# Patient Record
Sex: FEMALE | Race: OTHER | Employment: FULL TIME | URBAN - METROPOLITAN AREA
[De-identification: names, ages, dates, MRNs, and addresses within clinical notes are randomized per-mention and may not be internally consistent; named-entity substitution may affect disease eponyms.]

---

## 2017-10-22 ENCOUNTER — HOSPITAL ENCOUNTER (EMERGENCY)
Facility: HOSPITAL | Age: 47
Discharge: HOME | End: 2017-10-22
Attending: EMERGENCY MEDICINE
Payer: COMMERCIAL

## 2017-10-22 ENCOUNTER — APPOINTMENT (OUTPATIENT)
Dept: CT IMAGING | Facility: HOSPITAL | Age: 47
End: 2017-10-22
Attending: EMERGENCY MEDICINE
Payer: COMMERCIAL

## 2017-10-22 VITALS
WEIGHT: 155 LBS | HEIGHT: 62 IN | SYSTOLIC BLOOD PRESSURE: 116 MMHG | TEMPERATURE: 98 F | BODY MASS INDEX: 28.52 KG/M2 | RESPIRATION RATE: 16 BRPM | HEART RATE: 60 BPM | OXYGEN SATURATION: 98 % | DIASTOLIC BLOOD PRESSURE: 71 MMHG

## 2017-10-22 DIAGNOSIS — R11.2 NAUSEA AND VOMITING IN ADULT: Primary | ICD-10-CM

## 2017-10-22 DIAGNOSIS — G44.209 TENSION HEADACHE: ICD-10-CM

## 2017-10-22 PROCEDURE — 80048 BASIC METABOLIC PNL TOTAL CA: CPT

## 2017-10-22 PROCEDURE — 36415 COLL VENOUS BLD VENIPUNCTURE: CPT

## 2017-10-22 PROCEDURE — 99284 EMERGENCY DEPT VISIT MOD MDM: CPT

## 2017-10-22 PROCEDURE — 85025 COMPLETE CBC W/AUTO DIFF WBC: CPT

## 2017-10-22 PROCEDURE — 80048 BASIC METABOLIC PNL TOTAL CA: CPT | Performed by: EMERGENCY MEDICINE

## 2017-10-22 PROCEDURE — 70450 CT HEAD/BRAIN W/O DYE: CPT | Performed by: EMERGENCY MEDICINE

## 2017-10-22 PROCEDURE — 85025 COMPLETE CBC W/AUTO DIFF WBC: CPT | Performed by: EMERGENCY MEDICINE

## 2017-10-22 NOTE — ED PROVIDER NOTES
Patient Seen in: [facility] AND [facility] Emergency Department    History   Patient presents with:  Headache (neurologic)    Stated Complaint:     HPI    Patient is a 48-year-old female who presents with headache ×4 days.   Patient states started at work 4 days for the following:        Result Value    Glucose 107 (*)     All other components within normal limits   CBC W/ DIFFERENTIAL - Normal   CBC WITH DIFFERENTIAL WITH PLATELET    Narrative:      The following orders were created for panel order CBC WITH DIFFER

## 2018-08-18 ENCOUNTER — LAB ENCOUNTER (OUTPATIENT)
Dept: LAB | Age: 48
End: 2018-08-18
Attending: FAMILY MEDICINE
Payer: COMMERCIAL

## 2018-08-18 DIAGNOSIS — Z00.00 WELLNESS EXAMINATION: Primary | ICD-10-CM

## 2018-08-18 LAB
ALBUMIN SERPL BCP-MCNC: 3.8 G/DL (ref 3.5–4.8)
ALBUMIN/GLOB SERPL: 1.1 {RATIO} (ref 1–2)
ALP SERPL-CCNC: 70 U/L (ref 32–100)
ALT SERPL-CCNC: 108 U/L (ref 14–54)
ANION GAP SERPL CALC-SCNC: 7 MMOL/L (ref 0–18)
AST SERPL-CCNC: 91 U/L (ref 15–41)
BASOPHILS # BLD: 0.1 K/UL (ref 0–0.2)
BASOPHILS NFR BLD: 1 %
BILIRUB SERPL-MCNC: 0.6 MG/DL (ref 0.3–1.2)
BUN SERPL-MCNC: 12 MG/DL (ref 8–20)
BUN/CREAT SERPL: 20.3 (ref 10–20)
CALCIUM SERPL-MCNC: 9.2 MG/DL (ref 8.5–10.5)
CHLORIDE SERPL-SCNC: 106 MMOL/L (ref 95–110)
CHOLEST SERPL-MCNC: 198 MG/DL (ref 110–200)
CO2 SERPL-SCNC: 27 MMOL/L (ref 22–32)
CREAT SERPL-MCNC: 0.59 MG/DL (ref 0.5–1.5)
EOSINOPHIL # BLD: 0.1 K/UL (ref 0–0.7)
EOSINOPHIL NFR BLD: 2 %
ERYTHROCYTE [DISTWIDTH] IN BLOOD BY AUTOMATED COUNT: 13.4 % (ref 11–15)
GLOBULIN PLAS-MCNC: 3.6 G/DL (ref 2.5–3.7)
GLUCOSE SERPL-MCNC: 117 MG/DL (ref 70–99)
HCT VFR BLD AUTO: 39.3 % (ref 35–48)
HDLC SERPL-MCNC: 31 MG/DL
HGB BLD-MCNC: 13.4 G/DL (ref 12–16)
LDLC SERPL CALC-MCNC: 142 MG/DL (ref 0–99)
LYMPHOCYTES # BLD: 1.8 K/UL (ref 1–4)
LYMPHOCYTES NFR BLD: 41 %
MCH RBC QN AUTO: 32.1 PG (ref 27–32)
MCHC RBC AUTO-ENTMCNC: 34.1 G/DL (ref 32–37)
MCV RBC AUTO: 94.1 FL (ref 80–100)
MONOCYTES # BLD: 0.4 K/UL (ref 0–1)
MONOCYTES NFR BLD: 8 %
NEUTROPHILS # BLD AUTO: 2.1 K/UL (ref 1.8–7.7)
NEUTROPHILS NFR BLD: 48 %
NONHDLC SERPL-MCNC: 167 MG/DL
OSMOLALITY UR CALC.SUM OF ELEC: 291 MOSM/KG (ref 275–295)
PATIENT FASTING: YES
PLATELET # BLD AUTO: 199 K/UL (ref 140–400)
PMV BLD AUTO: 10.5 FL (ref 7.4–10.3)
POTASSIUM SERPL-SCNC: 4.5 MMOL/L (ref 3.3–5.1)
PROT SERPL-MCNC: 7.4 G/DL (ref 5.9–8.4)
RBC # BLD AUTO: 4.17 M/UL (ref 3.7–5.4)
SODIUM SERPL-SCNC: 140 MMOL/L (ref 136–144)
TRIGL SERPL-MCNC: 123 MG/DL (ref 1–149)
TSH SERPL-ACNC: 2.51 UIU/ML (ref 0.45–5.33)
WBC # BLD AUTO: 4.4 K/UL (ref 4–11)

## 2018-08-18 PROCEDURE — 85025 COMPLETE CBC W/AUTO DIFF WBC: CPT

## 2018-08-18 PROCEDURE — 80061 LIPID PANEL: CPT

## 2018-08-18 PROCEDURE — 36415 COLL VENOUS BLD VENIPUNCTURE: CPT

## 2018-08-18 PROCEDURE — 83036 HEMOGLOBIN GLYCOSYLATED A1C: CPT

## 2018-08-18 PROCEDURE — 84443 ASSAY THYROID STIM HORMONE: CPT

## 2018-08-18 PROCEDURE — 80053 COMPREHEN METABOLIC PANEL: CPT

## 2018-08-19 LAB — HBA1C MFR BLD: 6.2 % (ref 4–6)

## 2018-08-22 ENCOUNTER — HOSPITAL ENCOUNTER (OUTPATIENT)
Dept: MAMMOGRAPHY | Age: 48
Discharge: HOME | End: 2018-08-22
Attending: FAMILY MEDICINE
Payer: COMMERCIAL

## 2018-08-22 DIAGNOSIS — Z12.31 ENCOUNTER FOR SCREENING MAMMOGRAM FOR MALIGNANT NEOPLASM OF BREAST: ICD-10-CM

## 2018-08-22 PROCEDURE — 77067 SCR MAMMO BI INCL CAD: CPT | Performed by: FAMILY MEDICINE

## 2018-09-06 ENCOUNTER — LAB ENCOUNTER (OUTPATIENT)
Dept: LAB | Age: 48
End: 2018-09-06
Attending: FAMILY MEDICINE
Payer: COMMERCIAL

## 2018-09-06 DIAGNOSIS — R74.8 ELEVATED LIVER ENZYMES: Primary | ICD-10-CM

## 2018-09-06 LAB
IGA SERPL-MCNC: 330 MG/DL (ref 68–378)
IGM SERPL-MCNC: 154 MG/DL (ref 60–263)
IMMUNOGLOBULIN PNL SER-MCNC: 1115 MG/DL (ref 694–1618)
TRANSFERRIN SERPL-MCNC: 231 MG/DL (ref 192–382)

## 2018-09-06 PROCEDURE — 82784 ASSAY IGA/IGD/IGG/IGM EACH: CPT

## 2018-09-06 PROCEDURE — 36415 COLL VENOUS BLD VENIPUNCTURE: CPT

## 2018-09-06 PROCEDURE — 86256 FLUORESCENT ANTIBODY TITER: CPT

## 2018-09-06 PROCEDURE — 82390 ASSAY OF CERULOPLASMIN: CPT

## 2018-09-06 PROCEDURE — 84466 ASSAY OF TRANSFERRIN: CPT

## 2018-09-06 PROCEDURE — 86038 ANTINUCLEAR ANTIBODIES: CPT

## 2018-09-08 LAB — CERULOPLASMIN: 27 MG/DL

## 2018-09-10 LAB — NUCLEAR IGG TITR SER IF: NEGATIVE {TITER}

## 2018-09-11 LAB — SMOOTH MUSCLE AB TITR SER: <20 {TITER}

## 2018-12-13 PROBLEM — M54.2 NECK PAIN: Status: ACTIVE | Noted: 2018-12-13

## 2018-12-13 PROBLEM — F43.10 POST-TRAUMATIC STRESS: Status: ACTIVE | Noted: 2018-12-13

## 2018-12-13 PROBLEM — M25.512 ACUTE PAIN OF LEFT SHOULDER: Status: ACTIVE | Noted: 2018-12-13

## 2018-12-13 PROBLEM — M54.6 ACUTE BILATERAL THORACIC BACK PAIN: Status: ACTIVE | Noted: 2018-12-13

## 2018-12-13 PROBLEM — M54.50 ACUTE BILATERAL LOW BACK PAIN WITHOUT SCIATICA: Status: ACTIVE | Noted: 2018-12-13

## 2018-12-13 PROBLEM — R10.2 PELVIC PAIN: Status: ACTIVE | Noted: 2018-12-13
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
## 2018-12-13 NOTE — PATIENT INSTRUCTIONS
STRAINS, SPRAINS, MUSCLE PULLS    *A strain is a stretching or tearing of muscle or tendon. A tendon is a fibrous cord of tissue that connects muscles to bones.      *A sprain is a stretching or tearing of ligaments — the tough bands of fibrous tissue that helpful. You have prescribed pain medication. After the first two days, gently begin to use the injured area. You should see a gradual, progressive improvement in the joint's ability to support your weight or your ability to move without pain.  Mild and

## 2018-12-13 NOTE — ASSESSMENT & PLAN NOTE
Naproxen 500 mg I po bid with food  Flexeril 5 mg I po tid prn-may cause drowsiness  Ice 20 min 3-4 times per day  Refer PT  Please call if symptoms worsen or are not resolving.

## 2018-12-19 ENCOUNTER — OFFICE VISIT (OUTPATIENT)
Dept: PHYSICAL THERAPY | Age: 48
End: 2018-12-19
Attending: NURSE PRACTITIONER
Payer: COMMERCIAL

## 2018-12-19 DIAGNOSIS — R10.2 PELVIC PAIN: ICD-10-CM

## 2018-12-19 DIAGNOSIS — M54.6 ACUTE BILATERAL THORACIC BACK PAIN: ICD-10-CM

## 2018-12-19 DIAGNOSIS — M25.512 ACUTE PAIN OF LEFT SHOULDER: ICD-10-CM

## 2018-12-19 DIAGNOSIS — M54.50 ACUTE BILATERAL LOW BACK PAIN WITHOUT SCIATICA: ICD-10-CM

## 2018-12-19 DIAGNOSIS — M54.2 NECK PAIN: ICD-10-CM

## 2018-12-19 PROCEDURE — 97162 PT EVAL MOD COMPLEX 30 MIN: CPT

## 2018-12-19 PROCEDURE — 97110 THERAPEUTIC EXERCISES: CPT

## 2018-12-19 NOTE — PROGRESS NOTES
P.T. EVALUATION:   Referring Physician: Dr. Radu Hernandez  Diagnosis: Acute pain of left shoulder (M25.512)  Acute bilateral low back pain without sciatica (M54.5)  Acute bilateral thoracic back pain (M54.6)  Pelvic pain (R10.2)  Neck pain (M54.2)  Date of On Cervical ROM:  Ext: Min loss (pain)  Flx: Min/Mod loss (pain)  Rot: R min loss (left pain), L min loss (NE)   Lat flx: R min loss (NE), L WNL (pain R side neck)    Shoulder ROM:   Flx: R WNL, L 75% range (pain)  Abd: R WNL, L min loss (pain)  ER/HBH: B W possible to [phone].  If you have any questions, please contact me at Dept: [phone]    Sincerely,  Electronically signed by therapist: Nimo Szymanski, PT    [de-identified] certification required: Yes  I certify the need for these services furnished un

## 2018-12-21 ENCOUNTER — OFFICE VISIT (OUTPATIENT)
Dept: PHYSICAL THERAPY | Age: 48
End: 2018-12-21
Attending: NURSE PRACTITIONER
Payer: COMMERCIAL

## 2018-12-21 DIAGNOSIS — M54.50 ACUTE BILATERAL LOW BACK PAIN WITHOUT SCIATICA: ICD-10-CM

## 2018-12-21 DIAGNOSIS — M54.2 NECK PAIN: ICD-10-CM

## 2018-12-21 DIAGNOSIS — R10.2 PELVIC PAIN: ICD-10-CM

## 2018-12-21 DIAGNOSIS — M25.512 ACUTE PAIN OF LEFT SHOULDER: ICD-10-CM

## 2018-12-21 DIAGNOSIS — M54.6 ACUTE BILATERAL THORACIC BACK PAIN: ICD-10-CM

## 2018-12-21 PROCEDURE — 97110 THERAPEUTIC EXERCISES: CPT

## 2018-12-21 NOTE — PROGRESS NOTES
Diagnosis: Acute pain of left shoulder (M25.512)  Acute bilateral low back pain without sciatica (M54.5)  Acute bilateral thoracic back pain (M54.6)  Pelvic pain (R10.2)  Neck pain (M54.2)       Next MD visit: none scheduled  Fall Risk: standard         Pr

## 2018-12-26 ENCOUNTER — OFFICE VISIT (OUTPATIENT)
Dept: PHYSICAL THERAPY | Age: 48
End: 2018-12-26
Attending: NURSE PRACTITIONER
Payer: COMMERCIAL

## 2018-12-26 DIAGNOSIS — R10.2 PELVIC PAIN: ICD-10-CM

## 2018-12-26 DIAGNOSIS — M54.50 ACUTE BILATERAL LOW BACK PAIN WITHOUT SCIATICA: ICD-10-CM

## 2018-12-26 DIAGNOSIS — M25.512 ACUTE PAIN OF LEFT SHOULDER: ICD-10-CM

## 2018-12-26 DIAGNOSIS — M54.6 ACUTE BILATERAL THORACIC BACK PAIN: ICD-10-CM

## 2018-12-26 DIAGNOSIS — M54.2 NECK PAIN: ICD-10-CM

## 2018-12-26 PROCEDURE — 97110 THERAPEUTIC EXERCISES: CPT

## 2018-12-26 PROCEDURE — 97140 MANUAL THERAPY 1/> REGIONS: CPT

## 2018-12-26 NOTE — PROGRESS NOTES
Diagnosis: Acute pain of left shoulder (M25.512)  Acute bilateral low back pain without sciatica (M54.5)  Acute bilateral thoracic back pain (M54.6)  Pelvic pain (R10.2)  Neck pain (M54.2)       Next MD visit: none scheduled  Fall Risk: standard         Pr scap high rows with Vabaduse 41 1x10  - standing B shoulder ext with Vabaduse 41 1x10  - standing B shoulder ER with Vabaduse 41 1x10     Manual Therapy   - trigger point suboccipitals/cervical musculature x 10 minutes -    Therapeutic Activity    -    Modalities    -        Asse

## 2019-01-03 ENCOUNTER — OFFICE VISIT (OUTPATIENT)
Dept: PHYSICAL THERAPY | Age: 49
End: 2019-01-03
Attending: NURSE PRACTITIONER
Payer: COMMERCIAL

## 2019-01-03 DIAGNOSIS — M54.6 ACUTE BILATERAL THORACIC BACK PAIN: ICD-10-CM

## 2019-01-03 DIAGNOSIS — M54.2 NECK PAIN: ICD-10-CM

## 2019-01-03 DIAGNOSIS — M25.512 ACUTE PAIN OF LEFT SHOULDER: ICD-10-CM

## 2019-01-03 DIAGNOSIS — M54.50 ACUTE BILATERAL LOW BACK PAIN WITHOUT SCIATICA: ICD-10-CM

## 2019-01-03 DIAGNOSIS — R10.2 PELVIC PAIN: ICD-10-CM

## 2019-01-03 PROCEDURE — 97110 THERAPEUTIC EXERCISES: CPT

## 2019-01-03 NOTE — PROGRESS NOTES
Diagnosis: Acute pain of left shoulder (M25.512)  Acute bilateral low back pain without sciatica (M54.5)  Acute bilateral thoracic back pain (M54.6)  Pelvic pain (R10.2)  Neck pain (M54.2)       Next MD visit: none scheduled  Fall Risk: standard         Pr hooklying R/L LTR 1 x 10 each (R sided discomfort with left rotation)  - supine R/L SKTC 1 x 10 each   - supine R/L SLR 1 x 10 each (L SLR L sided LBP)  - CKC towel flex stretch R/L sidebend 1 x 10 each  - reassess cervical ROM x 4 minutes  - seated C-retr

## 2019-01-08 ENCOUNTER — OFFICE VISIT (OUTPATIENT)
Dept: PHYSICAL THERAPY | Age: 49
End: 2019-01-08
Attending: NURSE PRACTITIONER
Payer: COMMERCIAL

## 2019-01-08 DIAGNOSIS — M25.512 ACUTE PAIN OF LEFT SHOULDER: ICD-10-CM

## 2019-01-08 DIAGNOSIS — M54.2 NECK PAIN: ICD-10-CM

## 2019-01-08 DIAGNOSIS — M54.50 ACUTE BILATERAL LOW BACK PAIN WITHOUT SCIATICA: ICD-10-CM

## 2019-01-08 DIAGNOSIS — M54.6 ACUTE BILATERAL THORACIC BACK PAIN: ICD-10-CM

## 2019-01-08 DIAGNOSIS — R10.2 PELVIC PAIN: ICD-10-CM

## 2019-01-08 PROCEDURE — 97110 THERAPEUTIC EXERCISES: CPT

## 2019-01-08 NOTE — PROGRESS NOTES
Diagnosis: Acute pain of left shoulder (M25.512)  Acute bilateral low back pain without sciatica (M54.5)  Acute bilateral thoracic back pain (M54.6)  Pelvic pain (R10.2)  Neck pain (M54.2)       Next MD visit: none scheduled  Fall Risk: standard         Pr holds 1 x 10   - seated C-extension upper/lower with towel OP 1 x 10 each  - seated R cervical rotation 1 x 10 AROM with towel OP   - seated thoracic extension ball OP 1 x 10  - seated scap squeeze with ball 10\" holds x 10 reps   - standing B narrow high ball 10x  - seated thoracic ext over ball 2x10  - supine c-retraction 10x  - supine repeat c-retraction 10x  - supine R/L c-rotation 10x ea  - supine shoulder flexion with wand 2x10  - supine B horizontal abd/add with 0# 1x10  - standing B shoulder extensi

## 2019-01-09 DIAGNOSIS — M54.2 NECK PAIN: ICD-10-CM

## 2019-01-09 DIAGNOSIS — R10.2 PELVIC PAIN: ICD-10-CM

## 2019-01-09 DIAGNOSIS — M54.50 ACUTE BILATERAL LOW BACK PAIN WITHOUT SCIATICA: ICD-10-CM

## 2019-01-09 DIAGNOSIS — M25.512 ACUTE PAIN OF LEFT SHOULDER: ICD-10-CM

## 2019-01-09 DIAGNOSIS — M54.6 ACUTE BILATERAL THORACIC BACK PAIN: ICD-10-CM

## 2019-01-09 RX ORDER — NAPROXEN 500 MG/1
500 TABLET ORAL 2 TIMES DAILY WITH MEALS
Qty: 60 TABLET | Refills: 0 | Status: SHIPPED | OUTPATIENT
Start: 2019-01-09 | End: 2019-01-24

## 2019-01-10 ENCOUNTER — OFFICE VISIT (OUTPATIENT)
Dept: PHYSICAL THERAPY | Age: 49
End: 2019-01-10
Attending: NURSE PRACTITIONER
Payer: COMMERCIAL

## 2019-01-10 DIAGNOSIS — M25.512 ACUTE PAIN OF LEFT SHOULDER: ICD-10-CM

## 2019-01-10 DIAGNOSIS — M54.6 ACUTE BILATERAL THORACIC BACK PAIN: ICD-10-CM

## 2019-01-10 DIAGNOSIS — M54.50 ACUTE BILATERAL LOW BACK PAIN WITHOUT SCIATICA: ICD-10-CM

## 2019-01-10 DIAGNOSIS — M54.2 NECK PAIN: ICD-10-CM

## 2019-01-10 DIAGNOSIS — R10.2 PELVIC PAIN: ICD-10-CM

## 2019-01-10 PROCEDURE — 97110 THERAPEUTIC EXERCISES: CPT

## 2019-01-10 NOTE — PROGRESS NOTES
Diagnosis: Acute pain of left shoulder (M25.512)  Acute bilateral low back pain without sciatica (M54.5)  Acute bilateral thoracic back pain (M54.6)  Pelvic pain (R10.2)  Neck pain (M54.2)       Next MD visit: none scheduled  Fall Risk: standard         Pr shoulder flex with cane AAROM 2 x 10   - supine B chest press serratus punch 2# DB 2 x 10   - supine B shoulder ER with YTB 2 x 10   - seated C-retraction 5\" holds 1 x 10   - seated C-extension upper/lower with towel OP 1 x 10 each  - seated R cervical ro pillow 5\" holds x 10 reps   - supine R/L cervical rotation AROM 1 x 10 each with 1 pillow  - bridging 1 x 10   - hooklying R/L LTR 1 x 10 each (R sided discomfort with left rotation)  - supine R/L SKTC 1 x 10 each   - supine R/L SLR 1 x 10 each (L SLR L s Treatment Time: 48 min

## 2019-01-16 ENCOUNTER — OFFICE VISIT (OUTPATIENT)
Dept: PHYSICAL THERAPY | Age: 49
End: 2019-01-16
Attending: NURSE PRACTITIONER
Payer: COMMERCIAL

## 2019-01-16 DIAGNOSIS — M25.512 ACUTE PAIN OF LEFT SHOULDER: ICD-10-CM

## 2019-01-16 DIAGNOSIS — M54.50 ACUTE BILATERAL LOW BACK PAIN WITHOUT SCIATICA: ICD-10-CM

## 2019-01-16 DIAGNOSIS — M54.6 ACUTE BILATERAL THORACIC BACK PAIN: ICD-10-CM

## 2019-01-16 DIAGNOSIS — M54.2 NECK PAIN: ICD-10-CM

## 2019-01-16 DIAGNOSIS — R10.2 PELVIC PAIN: ICD-10-CM

## 2019-01-16 PROCEDURE — 97110 THERAPEUTIC EXERCISES: CPT

## 2019-01-16 NOTE — PROGRESS NOTES
Diagnosis: Acute pain of left shoulder (M25.512)  Acute bilateral low back pain without sciatica (M54.5)  Acute bilateral thoracic back pain (M54.6)  Pelvic pain (R10.2)  Neck pain (M54.2)       Next MD visit: none scheduled  Fall Risk: standard         Pr x 20 AROM with towel OP   - seated thoracic extension ball OP 1 x 10  - standing B narrow high row with GSC 3 x 10   - standing B shoulder extension 3 x 10 with Vabaduse 41  - standing B shoulder ER with AAS 2 x 10 each with Vabaduse 41  - face pull 5# 2 x 10   - standing standing B narrow high row with GSC 3 x 10   - standing B shoulder extension 3 x 10 with Vabaduse 41  - standing B shoulder ER with AAS 1 x 15 each with Vabaduse 41  - B L/E shuttle knee extension 5 bands 2 x 10 (setting 4)  - R/L L/E shuttle knee extension 4 bands 1 x 10 standing B shoulder extension with wand 2x10  - standing B scap high rows with Vabaduse 41 1x10  - standing B shoulder ext with Vabaduse 41 1x10  - standing B shoulder ER with Vabaduse 41 1x10     Manual Therapy       - trigger point suboccipitals/cervical musculature x 10 minute

## 2019-01-21 ENCOUNTER — OFFICE VISIT (OUTPATIENT)
Dept: PHYSICAL THERAPY | Age: 49
End: 2019-01-21
Attending: NURSE PRACTITIONER
Payer: COMMERCIAL

## 2019-01-21 PROCEDURE — 97110 THERAPEUTIC EXERCISES: CPT

## 2019-01-21 NOTE — PROGRESS NOTES
Physical Therapy Discharge Summary    Pt has attended 8 visits in Physical Therapy.      Diagnosis: Acute pain of left shoulder (M25.512)  Acute bilateral low back pain without sciatica (M54.5)  Acute bilateral thoracic back pain (M54.6)  Pelvic pain (R10.2 seated thoracic extension ball OP 1 x 10  - standing B narrow high and mid row with GSC 3 x 10   - standing B shoulder extension 3 x 10 with Vabaduse 41  - standing B shoulder ER with AAS 2 x 10 each with Vabaduse 41  - face pull 10# 1 x 10   - hooklying R/L LTR with SB 1 with Vabaduse 41 1 x 10 each   - cat/camel 1 x 20 each   - hooklying R/L LTR 1 x 10 each   - supine C-retraction with 1 pillow 5\" holds x 20 reps  - supine R/L cervical rotation AROM 1 x 10 each with 1 pillow  - supine OH shoulder flex wand 2x10  - supine B chest OH shoulder flex AAROM with cane 2 x 10  - supine B horizontal abduction AROM 1 x 15 pain free   - supine R/L shoulder ER with AAS with YTB around wrists 1 x 10 each  - supine C-retraction with 1 pillow 5\" holds x 10 reps   - supine R/L cervical rotation min    Goals:   1- Pt will be I with maintenance and progression of HEP -MET  2- Pt will demo increase in cervical ROM to WNL to ease ability for pt to drive - NEARLY MET  3- Pt will demo increase in L shoulder ROM to WNL to ease ability for pt to reach -

## 2019-01-22 ENCOUNTER — APPOINTMENT (OUTPATIENT)
Dept: PHYSICAL THERAPY | Age: 49
End: 2019-01-22
Attending: NURSE PRACTITIONER
Payer: COMMERCIAL

## 2019-01-24 ENCOUNTER — OFFICE VISIT (OUTPATIENT)
Dept: OBGYN CLINIC | Facility: CLINIC | Age: 49
End: 2019-01-24
Payer: COMMERCIAL

## 2019-01-24 ENCOUNTER — APPOINTMENT (OUTPATIENT)
Dept: LAB | Age: 49
End: 2019-01-24
Attending: OBSTETRICS & GYNECOLOGY
Payer: COMMERCIAL

## 2019-01-24 ENCOUNTER — TELEPHONE (OUTPATIENT)
Dept: OBGYN CLINIC | Facility: CLINIC | Age: 49
End: 2019-01-24

## 2019-01-24 VITALS
HEART RATE: 83 BPM | BODY MASS INDEX: 32 KG/M2 | SYSTOLIC BLOOD PRESSURE: 117 MMHG | WEIGHT: 173 LBS | DIASTOLIC BLOOD PRESSURE: 79 MMHG

## 2019-01-24 DIAGNOSIS — R39.9 UTI SYMPTOMS: Primary | ICD-10-CM

## 2019-01-24 DIAGNOSIS — K64.9 HEMORRHOIDS WITHOUT COMPLICATION: ICD-10-CM

## 2019-01-24 DIAGNOSIS — R35.0 URINARY FREQUENCY: ICD-10-CM

## 2019-01-24 DIAGNOSIS — R30.0 DYSURIA: ICD-10-CM

## 2019-01-24 LAB
BILIRUB UR QL: NEGATIVE
CLARITY UR: CLEAR
COLOR UR: YELLOW
GLUCOSE UR-MCNC: NEGATIVE MG/DL
KETONES UR-MCNC: NEGATIVE MG/DL
NITRITE UR QL STRIP.AUTO: NEGATIVE
PH UR: 6 [PH] (ref 5–8)
PROT UR-MCNC: NEGATIVE MG/DL
RBC #/AREA URNS AUTO: 3 /HPF
SP GR UR STRIP: 1.02 (ref 1–1.03)
UROBILINOGEN UR STRIP-ACNC: <2
VIT C UR-MCNC: NEGATIVE MG/DL
WBC #/AREA URNS AUTO: 64 /HPF

## 2019-01-24 PROCEDURE — 81001 URINALYSIS AUTO W/SCOPE: CPT

## 2019-01-24 PROCEDURE — 99202 OFFICE O/P NEW SF 15 MIN: CPT | Performed by: OBSTETRICS & GYNECOLOGY

## 2019-01-24 PROCEDURE — 87086 URINE CULTURE/COLONY COUNT: CPT

## 2019-01-24 RX ORDER — NITROFURANTOIN 25; 75 MG/1; MG/1
100 CAPSULE ORAL 2 TIMES DAILY
Qty: 14 CAPSULE | Refills: 0 | Status: SHIPPED | OUTPATIENT
Start: 2019-01-24 | End: 2019-01-31

## 2019-01-24 NOTE — PROGRESS NOTES
[facility], [facility]  Obstetrics and Gynecology  Focused Gynecology Problem Exam  Madhavi Sparks MD    Brayan Covert is a 51year old female presenting for Gyn Exam (lower pelvic pain 7/10, urinary frequency for the past 4 days.)  .     HPI:   Patient present on file      Number of children: Not on file      Years of education: Not on file      Highest education level: Not on file    Social Needs      Financial resource strain: Not on file      Food insecurity - worry: Not on file      Food insecurity - inabili visit and greater than 50% of the time was spent counseling the patient and/or coordinating care.     ORDERS:   Orders Placed This Encounter      Urinalysis, Routine      Urine Culture, Routine    PRESCRIPTIONS:     Requested Prescriptions     Signed Prescr

## 2019-01-25 NOTE — TELEPHONE ENCOUNTER
Insurance is not covering the following medication:   Hydrocortisone Acetate 25 MG Rectal Suppos 28 suppository 0 1/24/2019    Sig :  Place 1 suppository (25 mg total) rectally 2 (two) times daily.      Route:   Rectal     Order #: [id number]           Pha

## 2019-01-26 ENCOUNTER — TELEPHONE (OUTPATIENT)
Dept: OBGYN CLINIC | Facility: CLINIC | Age: 49
End: 2019-01-26

## 2019-01-26 NOTE — TELEPHONE ENCOUNTER
----- Message from Ebonie Aquino MD sent at 1/25/2019  9:47 PM CST -----  Result noted. Please notify patient of negative urine culture result. She does not need to continue taking her antibiotics.

## 2019-01-28 ENCOUNTER — APPOINTMENT (OUTPATIENT)
Dept: PHYSICAL THERAPY | Age: 49
End: 2019-01-28
Attending: NURSE PRACTITIONER
Payer: COMMERCIAL

## 2019-01-30 NOTE — TELEPHONE ENCOUNTER
Left message informing her to stop taking antibiotics as directed by Rob result note. Asked for her to call back to let us know she has stopped medication.

## 2019-01-31 DIAGNOSIS — R10.2 PELVIC PAIN: ICD-10-CM

## 2019-01-31 DIAGNOSIS — M25.512 ACUTE PAIN OF LEFT SHOULDER: ICD-10-CM

## 2019-01-31 DIAGNOSIS — M54.6 ACUTE BILATERAL THORACIC BACK PAIN: ICD-10-CM

## 2019-01-31 DIAGNOSIS — M54.50 ACUTE BILATERAL LOW BACK PAIN WITHOUT SCIATICA: ICD-10-CM

## 2019-01-31 DIAGNOSIS — M54.2 NECK PAIN: ICD-10-CM

## 2019-01-31 NOTE — TELEPHONE ENCOUNTER
Left detailed message regarding the instruction per JF to have pt stop  taking antibiotics. If any questions to please call back.

## 2019-02-01 RX ORDER — NAPROXEN 500 MG/1
500 TABLET ORAL 2 TIMES DAILY WITH MEALS
Qty: 60 TABLET | Refills: 2 | Status: SHIPPED | OUTPATIENT
Start: 2019-02-01

## 2019-02-01 NOTE — TELEPHONE ENCOUNTER
Review pended refill request as it does not fall under a protocol.     Last Rx: 1/9/19 with end date 1/24/19  LOV: 12/13/18

## 2019-02-04 ENCOUNTER — APPOINTMENT (OUTPATIENT)
Dept: PHYSICAL THERAPY | Age: 49
End: 2019-02-04
Attending: NURSE PRACTITIONER
Payer: COMMERCIAL

## 2020-01-06 ENCOUNTER — HOSPITAL ENCOUNTER (OUTPATIENT)
Dept: MAMMOGRAPHY | Age: 50
Discharge: HOME | End: 2020-01-06
Attending: FAMILY MEDICINE
Payer: COMMERCIAL

## 2020-01-06 DIAGNOSIS — Z13.9 ENCOUNTER FOR SCREENING: ICD-10-CM

## 2020-01-06 PROCEDURE — 77063 BREAST TOMOSYNTHESIS BI: CPT | Performed by: FAMILY MEDICINE

## 2020-01-06 PROCEDURE — 77067 SCR MAMMO BI INCL CAD: CPT | Performed by: FAMILY MEDICINE

## 2021-01-12 ENCOUNTER — OFFICE VISIT (OUTPATIENT)
Dept: FAMILY MEDICINE CLINIC | Facility: CLINIC | Age: 51
End: 2021-01-12
Payer: COMMERCIAL

## 2021-01-12 VITALS
WEIGHT: 166 LBS | SYSTOLIC BLOOD PRESSURE: 114 MMHG | HEART RATE: 81 BPM | BODY MASS INDEX: 30.55 KG/M2 | DIASTOLIC BLOOD PRESSURE: 77 MMHG | TEMPERATURE: 97 F | HEIGHT: 62 IN

## 2021-01-12 DIAGNOSIS — Z00.00 ANNUAL PHYSICAL EXAM: Primary | ICD-10-CM

## 2021-01-12 DIAGNOSIS — K64.9 HEMORRHOIDS WITHOUT COMPLICATION: ICD-10-CM

## 2021-01-12 PROCEDURE — 3008F BODY MASS INDEX DOCD: CPT | Performed by: FAMILY MEDICINE

## 2021-01-12 PROCEDURE — 90471 IMMUNIZATION ADMIN: CPT | Performed by: FAMILY MEDICINE

## 2021-01-12 PROCEDURE — 90750 HZV VACC RECOMBINANT IM: CPT | Performed by: FAMILY MEDICINE

## 2021-01-12 PROCEDURE — 3074F SYST BP LT 130 MM HG: CPT | Performed by: FAMILY MEDICINE

## 2021-01-12 PROCEDURE — 99396 PREV VISIT EST AGE 40-64: CPT | Performed by: FAMILY MEDICINE

## 2021-01-12 PROCEDURE — 3078F DIAST BP <80 MM HG: CPT | Performed by: FAMILY MEDICINE

## 2021-01-12 RX ORDER — HYDROCORTISONE 25 MG/G
1 CREAM TOPICAL 2 TIMES DAILY
Qty: 28 G | Refills: 0 | Status: SHIPPED | OUTPATIENT
Start: 2021-01-12 | End: 2021-04-28

## 2021-01-12 NOTE — PROGRESS NOTES
HPI:   Katty Huynh is a 45year old female who presents for a complete physical exam.    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    Social: Lives with family  Diet: eats home cooked meals   Exercise: Sedentary   GYN history:   Last P exertion  GI: denies abdominal pain,denies heartburn, occ brbpr  : denies dysuria, vaginal discharge or itching  MUSCULOSKELETAL: denies back pain  NEURO: denies headaches  PSYCHE: denies depression or anxiety  HEMATOLOGIC: denies hx of anemia or easy br COLLECTION; Future  - GASTRO - INTERNAL  - ZOSTER VACC RECOMBINANT IM NJX  - OCCULT BLOOD, FECAL, IMMUNOASSAY; Future     RTC if no improvement in symptoms. Red flags discussed to go to WOO Harvey MD  1/12/2021  10:48 AM

## 2021-01-14 LAB — HPV I/H RISK 1 DNA SPEC QL NAA+PROBE: NEGATIVE

## 2021-01-19 ENCOUNTER — LAB ENCOUNTER (OUTPATIENT)
Dept: LAB | Age: 51
End: 2021-01-19
Attending: FAMILY MEDICINE
Payer: COMMERCIAL

## 2021-01-19 DIAGNOSIS — Z00.00 ANNUAL PHYSICAL EXAM: ICD-10-CM

## 2021-01-19 LAB
ALBUMIN SERPL-MCNC: 3.6 G/DL (ref 3.4–5)
ALBUMIN/GLOB SERPL: 0.8 {RATIO} (ref 1–2)
ALP LIVER SERPL-CCNC: 93 U/L
ALT SERPL-CCNC: 40 U/L
ANION GAP SERPL CALC-SCNC: 6 MMOL/L (ref 0–18)
AST SERPL-CCNC: 28 U/L (ref 15–37)
BASOPHILS # BLD AUTO: 0.04 X10(3) UL (ref 0–0.2)
BASOPHILS NFR BLD AUTO: 0.7 %
BILIRUB SERPL-MCNC: 0.3 MG/DL (ref 0.1–2)
BUN BLD-MCNC: 11 MG/DL (ref 7–18)
BUN/CREAT SERPL: 17.2 (ref 10–20)
CALCIUM BLD-MCNC: 9.5 MG/DL (ref 8.5–10.1)
CHLORIDE SERPL-SCNC: 104 MMOL/L (ref 98–112)
CHOLEST SMN-MCNC: 186 MG/DL (ref ?–200)
CO2 SERPL-SCNC: 27 MMOL/L (ref 21–32)
CREAT BLD-MCNC: 0.64 MG/DL
DEPRECATED RDW RBC AUTO: 45.1 FL (ref 35.1–46.3)
EOSINOPHIL # BLD AUTO: 0.04 X10(3) UL (ref 0–0.7)
EOSINOPHIL NFR BLD AUTO: 0.7 %
ERYTHROCYTE [DISTWIDTH] IN BLOOD BY AUTOMATED COUNT: 13.2 % (ref 11–15)
EST. AVERAGE GLUCOSE BLD GHB EST-MCNC: 128 MG/DL (ref 68–126)
GLOBULIN PLAS-MCNC: 4.7 G/DL (ref 2.8–4.4)
GLUCOSE BLD-MCNC: 100 MG/DL (ref 70–99)
HBA1C MFR BLD HPLC: 6.1 % (ref ?–5.7)
HCT VFR BLD AUTO: 42.2 %
HDLC SERPL-MCNC: 33 MG/DL (ref 40–59)
HGB BLD-MCNC: 14 G/DL
IMM GRANULOCYTES # BLD AUTO: 0.01 X10(3) UL (ref 0–1)
IMM GRANULOCYTES NFR BLD: 0.2 %
LDLC SERPL CALC-MCNC: 111 MG/DL (ref ?–100)
LYMPHOCYTES # BLD AUTO: 1.98 X10(3) UL (ref 1–4)
LYMPHOCYTES NFR BLD AUTO: 36.9 %
M PROTEIN MFR SERPL ELPH: 8.3 G/DL (ref 6.4–8.2)
MCH RBC QN AUTO: 31 PG (ref 26–34)
MCHC RBC AUTO-ENTMCNC: 33.2 G/DL (ref 31–37)
MCV RBC AUTO: 93.4 FL
MONOCYTES # BLD AUTO: 0.38 X10(3) UL (ref 0.1–1)
MONOCYTES NFR BLD AUTO: 7.1 %
NEUTROPHILS # BLD AUTO: 2.92 X10 (3) UL (ref 1.5–7.7)
NEUTROPHILS # BLD AUTO: 2.92 X10(3) UL (ref 1.5–7.7)
NEUTROPHILS NFR BLD AUTO: 54.4 %
NONHDLC SERPL-MCNC: 153 MG/DL (ref ?–130)
OSMOLALITY SERPL CALC.SUM OF ELEC: 283 MOSM/KG (ref 275–295)
PATIENT FASTING Y/N/NP: YES
PATIENT FASTING Y/N/NP: YES
PLATELET # BLD AUTO: 218 10(3)UL (ref 150–450)
POTASSIUM SERPL-SCNC: 4.4 MMOL/L (ref 3.5–5.1)
RBC # BLD AUTO: 4.52 X10(6)UL
SODIUM SERPL-SCNC: 137 MMOL/L (ref 136–145)
T4 FREE SERPL-MCNC: 1 NG/DL (ref 0.8–1.7)
TRIGL SERPL-MCNC: 208 MG/DL (ref 30–149)
TSI SER-ACNC: 4.2 MIU/ML (ref 0.36–3.74)
VLDLC SERPL CALC-MCNC: 42 MG/DL (ref 0–30)
WBC # BLD AUTO: 5.4 X10(3) UL (ref 4–11)

## 2021-01-19 PROCEDURE — 85025 COMPLETE CBC W/AUTO DIFF WBC: CPT

## 2021-01-19 PROCEDURE — 36415 COLL VENOUS BLD VENIPUNCTURE: CPT

## 2021-01-19 PROCEDURE — 80061 LIPID PANEL: CPT

## 2021-01-19 PROCEDURE — 80053 COMPREHEN METABOLIC PANEL: CPT

## 2021-01-19 PROCEDURE — 83036 HEMOGLOBIN GLYCOSYLATED A1C: CPT

## 2021-01-19 PROCEDURE — 84439 ASSAY OF FREE THYROXINE: CPT

## 2021-01-19 PROCEDURE — 84443 ASSAY THYROID STIM HORMONE: CPT

## 2021-01-19 PROCEDURE — 82306 VITAMIN D 25 HYDROXY: CPT

## 2021-01-20 LAB — 25(OH)D3 SERPL-MCNC: 7.6 NG/ML (ref 30–100)

## 2021-01-21 ENCOUNTER — TELEPHONE (OUTPATIENT)
Dept: FAMILY MEDICINE CLINIC | Facility: CLINIC | Age: 51
End: 2021-01-21

## 2021-01-21 NOTE — TELEPHONE ENCOUNTER
----- Message from Trevon Vincent MD sent at 1/21/2021 10:14 AM CST -----  Results reviewed. Please inform patient that her Pap smear results were normal and that this can be repeated in 3 to 5 years.

## 2021-01-23 PROBLEM — E78.2 MIXED HYPERLIPIDEMIA: Status: ACTIVE | Noted: 2021-01-23

## 2021-01-23 PROBLEM — R73.03 PREDIABETES: Status: ACTIVE | Noted: 2021-01-23

## 2021-01-23 PROBLEM — R79.89 ELEVATED TSH: Status: ACTIVE | Noted: 2021-01-23

## 2021-01-26 ENCOUNTER — VIRTUAL PHONE E/M (OUTPATIENT)
Dept: FAMILY MEDICINE CLINIC | Facility: CLINIC | Age: 51
End: 2021-01-26
Payer: COMMERCIAL

## 2021-01-26 DIAGNOSIS — E78.2 MIXED HYPERLIPIDEMIA: Primary | ICD-10-CM

## 2021-01-26 DIAGNOSIS — R73.03 PREDIABETES: ICD-10-CM

## 2021-01-26 DIAGNOSIS — E55.9 VITAMIN D DEFICIENCY: ICD-10-CM

## 2021-01-26 DIAGNOSIS — R79.89 ELEVATED TSH: ICD-10-CM

## 2021-01-26 PROCEDURE — 99213 OFFICE O/P EST LOW 20 MIN: CPT | Performed by: FAMILY MEDICINE

## 2021-01-26 RX ORDER — ERGOCALCIFEROL 1.25 MG/1
50000 CAPSULE ORAL WEEKLY
Qty: 12 CAPSULE | Refills: 0 | Status: SHIPPED | OUTPATIENT
Start: 2021-01-26 | End: 2021-04-26

## 2021-01-26 NOTE — PROGRESS NOTES
Virtual Telephone Check-In    [facility] verbally consents to a Virtual/Telephone Check-In service on 01/26/21. Patient understands and accepts financial responsibility for any deductible, co-insurance and/or co-pays associated with this service.

## 2021-01-28 ENCOUNTER — HOSPITAL ENCOUNTER (OUTPATIENT)
Dept: MAMMOGRAPHY | Age: 51
Discharge: HOME | End: 2021-01-28
Attending: FAMILY MEDICINE
Payer: COMMERCIAL

## 2021-01-28 DIAGNOSIS — Z00.00 ANNUAL PHYSICAL EXAM: ICD-10-CM

## 2021-01-28 PROCEDURE — 77067 SCR MAMMO BI INCL CAD: CPT | Performed by: FAMILY MEDICINE

## 2021-01-28 PROCEDURE — 77063 BREAST TOMOSYNTHESIS BI: CPT | Performed by: FAMILY MEDICINE

## 2021-02-09 ENCOUNTER — TELEPHONE (OUTPATIENT)
Dept: FAMILY MEDICINE CLINIC | Facility: CLINIC | Age: 51
End: 2021-02-09

## 2021-02-09 NOTE — TELEPHONE ENCOUNTER
Clarence recommend she take advantage and get the vaccine. I have reviewed her medication and medical hx.

## 2021-02-09 NOTE — TELEPHONE ENCOUNTER
Citizen of [place] Speaking - Patient states she will be getting covid vaccine this Thursday 02/11/2021. Patient wants to know if there will be an issue of her getting the vaccine and does Dr. Kemal Krueger recommend she get vaccine. Please advise.

## 2021-02-09 NOTE — TELEPHONE ENCOUNTER
Patient returning call, Dr Jermaine García recommendation to get the Covid vaccine was relayed and the patient plans to get the vaccine next Thursday

## 2021-03-12 DIAGNOSIS — Z23 NEED FOR VACCINATION: ICD-10-CM

## 2021-04-25 DIAGNOSIS — E55.9 VITAMIN D DEFICIENCY: ICD-10-CM

## 2021-04-26 RX ORDER — ERGOCALCIFEROL 1.25 MG/1
50000 CAPSULE ORAL WEEKLY
Qty: 12 CAPSULE | Refills: 0 | Status: SHIPPED | OUTPATIENT
Start: 2021-04-26 | End: 2021-07-15

## 2021-04-28 DIAGNOSIS — K64.9 HEMORRHOIDS WITHOUT COMPLICATION: ICD-10-CM

## 2021-04-28 RX ORDER — HYDROCORTISONE 25 MG/G
1 CREAM TOPICAL 2 TIMES DAILY
Qty: 30 G | Refills: 0 | Status: SHIPPED | OUTPATIENT
Start: 2021-04-28 | End: 2021-05-05

## 2021-05-21 ENCOUNTER — OFFICE VISIT (OUTPATIENT)
Dept: FAMILY MEDICINE CLINIC | Facility: CLINIC | Age: 51
End: 2021-05-21
Payer: COMMERCIAL

## 2021-05-21 VITALS
SYSTOLIC BLOOD PRESSURE: 106 MMHG | HEIGHT: 62 IN | DIASTOLIC BLOOD PRESSURE: 76 MMHG | WEIGHT: 164 LBS | BODY MASS INDEX: 30.18 KG/M2 | HEART RATE: 102 BPM

## 2021-05-21 DIAGNOSIS — R30.0 DYSURIA: Primary | ICD-10-CM

## 2021-05-21 DIAGNOSIS — Z23 IMMUNIZATION DUE: ICD-10-CM

## 2021-05-21 DIAGNOSIS — R35.0 URINARY FREQUENCY: ICD-10-CM

## 2021-05-21 PROCEDURE — 3074F SYST BP LT 130 MM HG: CPT | Performed by: NURSE PRACTITIONER

## 2021-05-21 PROCEDURE — 81003 URINALYSIS AUTO W/O SCOPE: CPT | Performed by: NURSE PRACTITIONER

## 2021-05-21 PROCEDURE — 3078F DIAST BP <80 MM HG: CPT | Performed by: NURSE PRACTITIONER

## 2021-05-21 PROCEDURE — 90750 HZV VACC RECOMBINANT IM: CPT | Performed by: NURSE PRACTITIONER

## 2021-05-21 PROCEDURE — 90471 IMMUNIZATION ADMIN: CPT | Performed by: NURSE PRACTITIONER

## 2021-05-21 PROCEDURE — 3008F BODY MASS INDEX DOCD: CPT | Performed by: NURSE PRACTITIONER

## 2021-05-21 PROCEDURE — 99213 OFFICE O/P EST LOW 20 MIN: CPT | Performed by: NURSE PRACTITIONER

## 2021-05-21 RX ORDER — NITROFURANTOIN 25; 75 MG/1; MG/1
100 CAPSULE ORAL 2 TIMES DAILY
Qty: 14 CAPSULE | Refills: 0 | Status: SHIPPED | OUTPATIENT
Start: 2021-05-21

## 2021-05-21 RX ORDER — PHENAZOPYRIDINE HYDROCHLORIDE 200 MG/1
200 TABLET, FILM COATED ORAL 3 TIMES DAILY PRN
Qty: 6 TABLET | Refills: 0 | Status: SHIPPED | OUTPATIENT
Start: 2021-05-21 | End: 2021-05-23

## 2021-05-21 NOTE — ASSESSMENT & PLAN NOTE
Urine dip and culture  Supportive care discussed to help alleviate symptoms  macrobid 100 mg I po bid x 7 days  Pyridium 200 mg I po tid x 2 days  Please call if symptoms worsen or are not resolving.

## 2021-05-21 NOTE — PATIENT INSTRUCTIONS
Infección De La Vejiga,Sussy [Bladder Infection: Female, Adult]    Radha infección de la vejiga (“cistitis” [cystitis - UTI]) suele provocar constantes deseos de orinar y ardor al orinar.  Es posible que la [facility] se noe turbia u Johnathan, o que [address] tomando píldoras anticonceptivas y tiene frecuentes infecciones de vejiga, háblelo con koo médico.  Visita De Control  Visite a koo médico si no smith desparecido todos los síntomas después de sharon días de [place].   [place]  si a

## 2021-05-21 NOTE — PROGRESS NOTES
HPI  Pt presents w dysuria and freq-symptoms started yesterday afternoon. Review of Systems   Constitutional: Negative for activity change, appetite change and fever. Genitourinary: Positive for dysuria, frequency and urgency.  Negative for flank pa Transportation (Non-Medical):   Physical Activity:       Days of Exercise per Week:       Minutes of Exercise per Session:   Stress:       Feeling of Stress :   Social Connections:       Frequency of Communication with Friends and Family:       Frequency o place, and time. Urine dip sm leuks and blood.    Assessment and Plan:  Problem List Items Addressed This Visit        Genitourinary    Dysuria - Primary     Urine dip and culture  Supportive care discussed to help alleviate symptoms  macrobid 100 mg

## 2021-05-27 ENCOUNTER — TELEPHONE (OUTPATIENT)
Dept: FAMILY MEDICINE CLINIC | Facility: CLINIC | Age: 51
End: 2021-05-27

## 2021-05-27 NOTE — TELEPHONE ENCOUNTER
Pt have not read [employer] result message     Your urine culture shows e coli infection. It is sensitive to the antibiotics prescribed. Please complete course of antibiotics as prescribed. Please call if symptoms worsen or are not resolving.   Brigitte Hinds

## 2021-05-28 NOTE — TELEPHONE ENCOUNTER
Patient contacted (Name and  of pt verified). All results and recommendations reviewed. Patient verbalizes understanding, denies further questions and agrees with plan of care. Patient feels better and has completed abx.

## 2021-07-14 DIAGNOSIS — E55.9 VITAMIN D DEFICIENCY: ICD-10-CM

## 2021-07-15 RX ORDER — ERGOCALCIFEROL 1.25 MG/1
50000 CAPSULE ORAL WEEKLY
Qty: 12 CAPSULE | Refills: 0 | Status: SHIPPED | OUTPATIENT
Start: 2021-07-15 | End: 2021-10-01

## 2021-08-08 DIAGNOSIS — R73.03 PREDIABETES: ICD-10-CM

## 2021-08-09 NOTE — TELEPHONE ENCOUNTER
Please review. Protocol failed / No protocol.     Requested Prescriptions   Pending Prescriptions Disp Refills    METFORMIN  MG Oral Tab [Pharmacy Med Name: METFORMIN  MG TABLET] 90 tablet 1     Sig: TAKE 1 TABLET BY MOUTH EVERY DAY WITH JAS

## 2022-02-22 ENCOUNTER — LAB ENCOUNTER (OUTPATIENT)
Dept: LAB | Age: 52
End: 2022-02-22
Attending: FAMILY MEDICINE
Payer: COMMERCIAL

## 2022-02-22 ENCOUNTER — OFFICE VISIT (OUTPATIENT)
Dept: FAMILY MEDICINE CLINIC | Facility: CLINIC | Age: 52
End: 2022-02-22
Payer: COMMERCIAL

## 2022-02-22 VITALS
HEART RATE: 98 BPM | WEIGHT: 165 LBS | HEIGHT: 62 IN | BODY MASS INDEX: 30.36 KG/M2 | TEMPERATURE: 98 F | SYSTOLIC BLOOD PRESSURE: 110 MMHG | DIASTOLIC BLOOD PRESSURE: 76 MMHG

## 2022-02-22 DIAGNOSIS — L65.9 HAIR LOSS DISORDER: ICD-10-CM

## 2022-02-22 DIAGNOSIS — R79.89 ELEVATED TSH: ICD-10-CM

## 2022-02-22 DIAGNOSIS — R73.03 PREDIABETES: ICD-10-CM

## 2022-02-22 DIAGNOSIS — Z00.00 ENCOUNTER FOR ANNUAL HEALTH EXAMINATION: Primary | ICD-10-CM

## 2022-02-22 DIAGNOSIS — E55.9 VITAMIN D DEFICIENCY: ICD-10-CM

## 2022-02-22 DIAGNOSIS — Z00.00 ENCOUNTER FOR ANNUAL HEALTH EXAMINATION: ICD-10-CM

## 2022-02-22 DIAGNOSIS — E78.2 MIXED HYPERLIPIDEMIA: ICD-10-CM

## 2022-02-22 DIAGNOSIS — Z12.31 ENCOUNTER FOR SCREENING MAMMOGRAM FOR MALIGNANT NEOPLASM OF BREAST: ICD-10-CM

## 2022-02-22 DIAGNOSIS — R53.83 FATIGUE, UNSPECIFIED TYPE: ICD-10-CM

## 2022-02-22 DIAGNOSIS — Z12.11 COLON CANCER SCREENING: ICD-10-CM

## 2022-02-22 LAB
ALBUMIN SERPL-MCNC: 3.8 G/DL (ref 3.4–5)
ALBUMIN/GLOB SERPL: 1 {RATIO} (ref 1–2)
ALP LIVER SERPL-CCNC: 91 U/L
ALT SERPL-CCNC: 61 U/L
ANION GAP SERPL CALC-SCNC: 4 MMOL/L (ref 0–18)
AST SERPL-CCNC: 41 U/L (ref 15–37)
BASOPHILS # BLD AUTO: 0.02 X10(3) UL (ref 0–0.2)
BASOPHILS NFR BLD AUTO: 0.4 %
BILIRUB SERPL-MCNC: 0.2 MG/DL (ref 0.1–2)
BUN BLD-MCNC: 17 MG/DL (ref 7–18)
BUN/CREAT SERPL: 25 (ref 10–20)
CALCIUM BLD-MCNC: 9.1 MG/DL (ref 8.5–10.1)
CHLORIDE SERPL-SCNC: 106 MMOL/L (ref 98–112)
CHOLEST SERPL-MCNC: 174 MG/DL (ref ?–200)
CO2 SERPL-SCNC: 30 MMOL/L (ref 21–32)
CREAT BLD-MCNC: 0.68 MG/DL
DEPRECATED RDW RBC AUTO: 46.1 FL (ref 35.1–46.3)
EOSINOPHIL # BLD AUTO: 0.04 X10(3) UL (ref 0–0.7)
EOSINOPHIL NFR BLD AUTO: 0.7 %
ERYTHROCYTE [DISTWIDTH] IN BLOOD BY AUTOMATED COUNT: 13.2 % (ref 11–15)
EST. AVERAGE GLUCOSE BLD GHB EST-MCNC: 131 MG/DL (ref 68–126)
FASTING PATIENT LIPID ANSWER: NO
FASTING STATUS PATIENT QL REPORTED: NO
GLOBULIN PLAS-MCNC: 3.7 G/DL (ref 2.8–4.4)
GLUCOSE BLD-MCNC: 88 MG/DL (ref 70–99)
HBA1C MFR BLD: 6.2 % (ref ?–5.7)
HCT VFR BLD AUTO: 39.5 %
HDLC SERPL-MCNC: 32 MG/DL (ref 40–59)
HGB BLD-MCNC: 13 G/DL
IMM GRANULOCYTES # BLD AUTO: 0.01 X10(3) UL (ref 0–1)
IMM GRANULOCYTES NFR BLD: 0.2 %
IRON SATN MFR SERPL: 26 %
IRON SERPL-MCNC: 82 UG/DL
LDLC SERPL CALC-MCNC: 104 MG/DL (ref ?–100)
LYMPHOCYTES # BLD AUTO: 2.33 X10(3) UL (ref 1–4)
LYMPHOCYTES NFR BLD AUTO: 41.7 %
MCH RBC QN AUTO: 31.2 PG (ref 26–34)
MCHC RBC AUTO-ENTMCNC: 32.9 G/DL (ref 31–37)
MCV RBC AUTO: 94.7 FL
MONOCYTES # BLD AUTO: 0.46 X10(3) UL (ref 0.1–1)
MONOCYTES NFR BLD AUTO: 8.2 %
NEUTROPHILS # BLD AUTO: 2.73 X10 (3) UL (ref 1.5–7.7)
NEUTROPHILS # BLD AUTO: 2.73 X10(3) UL (ref 1.5–7.7)
NEUTROPHILS NFR BLD AUTO: 48.8 %
NONHDLC SERPL-MCNC: 142 MG/DL (ref ?–130)
OSMOLALITY SERPL CALC.SUM OF ELEC: 291 MOSM/KG (ref 275–295)
PLATELET # BLD AUTO: 221 10(3)UL (ref 150–450)
POTASSIUM SERPL-SCNC: 3.9 MMOL/L (ref 3.5–5.1)
PROT SERPL-MCNC: 7.5 G/DL (ref 6.4–8.2)
RBC # BLD AUTO: 4.17 X10(6)UL
SODIUM SERPL-SCNC: 140 MMOL/L (ref 136–145)
T3 SERPL-MCNC: 117 NG/DL (ref 60–181)
T4 FREE SERPL-MCNC: 1 NG/DL (ref 0.8–1.7)
T4 SERPL-MCNC: 10.2 UG/DL
THYROPEROXIDASE AB SERPL-ACNC: 35 U/ML (ref ?–60)
TIBC SERPL-MCNC: 320 UG/DL (ref 240–450)
TRANSFERRIN SERPL-MCNC: 215 MG/DL (ref 200–360)
TRIGL SERPL-MCNC: 220 MG/DL (ref 30–149)
TSI SER-ACNC: 2.01 MIU/ML (ref 0.36–3.74)
VIT D+METAB SERPL-MCNC: 18.3 NG/ML (ref 30–100)
VLDLC SERPL CALC-MCNC: 37 MG/DL (ref 0–30)
WBC # BLD AUTO: 5.6 X10(3) UL (ref 4–11)

## 2022-02-22 PROCEDURE — 36415 COLL VENOUS BLD VENIPUNCTURE: CPT

## 2022-02-22 PROCEDURE — 86376 MICROSOMAL ANTIBODY EACH: CPT

## 2022-02-22 PROCEDURE — 82306 VITAMIN D 25 HYDROXY: CPT

## 2022-02-22 PROCEDURE — 3008F BODY MASS INDEX DOCD: CPT | Performed by: FAMILY MEDICINE

## 2022-02-22 PROCEDURE — 83540 ASSAY OF IRON: CPT

## 2022-02-22 PROCEDURE — 3078F DIAST BP <80 MM HG: CPT | Performed by: FAMILY MEDICINE

## 2022-02-22 PROCEDURE — 3074F SYST BP LT 130 MM HG: CPT | Performed by: FAMILY MEDICINE

## 2022-02-22 PROCEDURE — 84480 ASSAY TRIIODOTHYRONINE (T3): CPT

## 2022-02-22 PROCEDURE — 80053 COMPREHEN METABOLIC PANEL: CPT

## 2022-02-22 PROCEDURE — 84436 ASSAY OF TOTAL THYROXINE: CPT

## 2022-02-22 PROCEDURE — 99396 PREV VISIT EST AGE 40-64: CPT | Performed by: FAMILY MEDICINE

## 2022-02-22 PROCEDURE — 80061 LIPID PANEL: CPT

## 2022-02-22 PROCEDURE — 85025 COMPLETE CBC W/AUTO DIFF WBC: CPT

## 2022-02-22 PROCEDURE — 84443 ASSAY THYROID STIM HORMONE: CPT

## 2022-02-22 PROCEDURE — 83036 HEMOGLOBIN GLYCOSYLATED A1C: CPT

## 2022-02-22 PROCEDURE — 84439 ASSAY OF FREE THYROXINE: CPT

## 2022-02-22 PROCEDURE — 84466 ASSAY OF TRANSFERRIN: CPT

## 2022-02-28 ENCOUNTER — OFFICE VISIT (OUTPATIENT)
Dept: FAMILY MEDICINE CLINIC | Facility: CLINIC | Age: 52
End: 2022-02-28
Payer: COMMERCIAL

## 2022-02-28 VITALS
TEMPERATURE: 98 F | BODY MASS INDEX: 30.36 KG/M2 | HEIGHT: 62 IN | DIASTOLIC BLOOD PRESSURE: 74 MMHG | HEART RATE: 81 BPM | WEIGHT: 165 LBS | SYSTOLIC BLOOD PRESSURE: 109 MMHG

## 2022-02-28 DIAGNOSIS — E78.2 MIXED HYPERLIPIDEMIA: ICD-10-CM

## 2022-02-28 DIAGNOSIS — R79.89 ELEVATED TSH: ICD-10-CM

## 2022-02-28 DIAGNOSIS — E55.9 VITAMIN D DEFICIENCY: ICD-10-CM

## 2022-02-28 DIAGNOSIS — R73.03 PREDIABETES: Primary | ICD-10-CM

## 2022-02-28 PROCEDURE — 3008F BODY MASS INDEX DOCD: CPT | Performed by: FAMILY MEDICINE

## 2022-02-28 PROCEDURE — 99214 OFFICE O/P EST MOD 30 MIN: CPT | Performed by: FAMILY MEDICINE

## 2022-02-28 PROCEDURE — 3078F DIAST BP <80 MM HG: CPT | Performed by: FAMILY MEDICINE

## 2022-02-28 PROCEDURE — 3074F SYST BP LT 130 MM HG: CPT | Performed by: FAMILY MEDICINE

## 2022-02-28 RX ORDER — FENOFIBRATE 54 MG/1
54 TABLET ORAL DAILY
Qty: 90 TABLET | Refills: 1 | Status: SHIPPED | OUTPATIENT
Start: 2022-02-28

## 2022-02-28 RX ORDER — ERGOCALCIFEROL 1.25 MG/1
50000 CAPSULE ORAL WEEKLY
Qty: 12 CAPSULE | Refills: 0 | Status: SHIPPED | OUTPATIENT
Start: 2022-02-28 | End: 2022-05-17

## 2022-03-15 ENCOUNTER — HOSPITAL ENCOUNTER (OUTPATIENT)
Dept: MAMMOGRAPHY | Age: 52
Discharge: HOME | End: 2022-03-15
Attending: FAMILY MEDICINE
Payer: COMMERCIAL

## 2022-03-15 DIAGNOSIS — Z00.00 ENCOUNTER FOR ANNUAL HEALTH EXAMINATION: ICD-10-CM

## 2022-03-15 DIAGNOSIS — Z12.31 ENCOUNTER FOR SCREENING MAMMOGRAM FOR MALIGNANT NEOPLASM OF BREAST: ICD-10-CM

## 2022-03-15 PROCEDURE — 77067 SCR MAMMO BI INCL CAD: CPT | Performed by: FAMILY MEDICINE

## 2022-03-15 PROCEDURE — 77063 BREAST TOMOSYNTHESIS BI: CPT | Performed by: FAMILY MEDICINE

## 2022-09-03 DIAGNOSIS — R73.03 PREDIABETES: ICD-10-CM

## 2022-09-03 DIAGNOSIS — E78.2 MIXED HYPERLIPIDEMIA: ICD-10-CM

## 2022-09-07 RX ORDER — FENOFIBRATE 54 MG/1
TABLET ORAL
Qty: 90 TABLET | Refills: 1 | Status: SHIPPED | OUTPATIENT
Start: 2022-09-07

## 2022-09-07 NOTE — TELEPHONE ENCOUNTER
Protocol failed or has No Protocol, please review  Requested Prescriptions   Pending Prescriptions Disp Refills    METFORMIN 500 MG Oral Tab [Pharmacy Med Name: METFORMIN  MG TABLET] 180 tablet 1     Sig: TAKE 1 TABLET BY MOUTH TWICE A DAY        Diabetes Medication Protocol Failed - 9/3/2022  6:58 AM        Failed - Last A1C < 7.5 and within past 6 months     Lab Results   Component Value Date    A1C 6.2 (H) 02/22/2022               Failed - In person appointment or virtual visit in the past 6 mos or appointment in next 3 mos       Recent Outpatient Visits              6 months ago Prediabetes    [facility], Jordan Coleman MD    Office Visit    6 months ago Encounter for annual health examination    [facility], Jordan Coleman MD    Office Visit    1 year ago [address] [facility], [address], JOSE Mason    Office Visit    1 year ago Mixed hyperlipidemia    [facility] Began, Jordan Coleman MD    Virtual Phone E/M    1 year ago Annual physical exam    [facility] Began, Jordan Coleman MD    Office Visit                 Failed - GFR in the past 12 months        Passed - GFR > 50     No results found for: [facility]                Signed Prescriptions Disp Refills    FENOFIBRATE 54 MG Oral Tab 90 tablet 1     Sig: TAKE 1 TABLET BY MOUTH DAILY        Cholesterol Medication Protocol Passed - 9/3/2022  6:58 AM        Passed - ALT in past 12 months        Passed - LDL in past 12 months        Passed - Last ALT < 80       Lab Results   Component Value Date    ALT 61 (H) 02/22/2022             Passed - Last LDL < 130     Lab Results   Component Value Date     (H) 02/22/2022               Passed - In person appointment or virtual visit in the past 12 mos or appointment in next 3 mos       Recent Outpatient Visits              6 months ago Prediabetes    [facility], Priscila Mcpherson MD    Office Visit    6 months ago Encounter for annual health examination    [address] Priscila Wright MD    Office Visit    1 year ago [address] [facility], JOSE [place]off    Office Visit    1 year ago Mixed hyperlipidemia    [address] Priscila Wright MD    Virtual Phone E/M    1 year ago Annual physical exam    [address] Priscila Wright MD    Office Visit                       Recent Outpatient Visits              6 months ago Prediabetes    [facility], [facility], [address], Priscila Mcpherson MD    Office Visit    6 months ago Encounter for annual health examination    [place] [facility] [place], [facility], [address], Priscila Mcpherson MD    Office Visit    1 year ago [address] [place] [address], [address], JO[place]off    Office Visit    1 year ago Mixed hyperlipidemia    Norma Lamas MD    Virtual Phone E/M    1 year ago Annual physical exam    [address] Priscila Wright MD    Office Visit

## 2022-09-07 NOTE — TELEPHONE ENCOUNTER
Refill passed per Cari Chaudhary protocol.   Requested Prescriptions   Pending Prescriptions Disp Refills    FENOFIBRATE 54 MG Oral Tab [Pharmacy Med Name: FENOFIBRATE 54 MG TABLET] 90 tablet 1     Sig: TAKE 1 TABLET BY MOUTH DAILY        Cholesterol Medication Protocol Passed - 9/3/2022  6:58 AM        Passed - ALT in past 12 months        Passed - LDL in past 12 months        Passed - Last ALT < 80       Lab Results   Component Value Date    ALT 61 (H) 02/22/2022             Passed - Last LDL < 130     Lab Results   Component Value Date     (H) 02/22/2022               Passed - In person appointment or virtual visit in the past 12 mos or appointment in next 3 mos       Recent Outpatient Visits              6 months ago Prediabetes    [facility], [address] [place] [address] Court, [address] Shakila Martinez MD    Office Visit    6 months ago Encounter for annual health examination    [facility], Jordan Coleman MD    Office Visit    1 year ago [address] [facility], [address], [place]    Office Visit    1 year ago Mixed hyperlipidemia    [facility] Began, Jordan Coleman MD    Virtual Phone E/M    1 year ago Annual physical exam    [facility] Began, Jordan Coleman MD    Office Visit                    METFORMIN 500 MG Oral Tab [Pharmacy Med Name: METFORMIN  MG TABLET] 180 tablet 1     Sig: TAKE 1 TABLET BY MOUTH TWICE A DAY        Diabetes Medication Protocol Failed - 9/3/2022  6:58 AM        Failed - Last A1C < 7.5 and within past 6 months     Lab Results   Component Value Date    A1C 6.2 (H) 02/22/2022               Failed - In person appointment or virtual visit in the past 6 mos or appointment in next 3 mos       Recent Outpatient Visits              6 months ago Maggie Ramos, Jordan Coleman MD    Office Visit    6 months ago Encounter for annual health examination [address] Dashawn Wright MD    Office Visit    1 year ago [address], [place] Carol Tan, APRN    Office Visit    1 year ago Mixed hyperlipidemia    [address] Cee Wright MD    Virtual Phone E/M    1 year ago Annual physical exam    [address] Cee Wright MD    Office Visit                 Failed - GFR in the past 12 months        Passed - GFR > 50     No results found for: [facility]                  Recent Outpatient Visits              6 months ago Prediabetes    [place], [facility], Cee Mendoza MD    Office Visit    6 months ago Encounter for annual health examination    [place], [facility] Felipe, Cee Mendoza MD    Office Visit    1 year ago [address], [address], Dashawn Tan, APRN    Office Visit    1 year ago Mixed hyperlipidemia    Perez Dee MD    Virtual Phone E/M    1 year ago Annual physical exam    [address] Cee Wright MD    Office Visit

## 2022-11-07 ENCOUNTER — TELEPHONE (OUTPATIENT)
Dept: OPHTHALMOLOGY | Facility: CLINIC | Age: 52
End: 2022-11-07

## 2022-11-07 ENCOUNTER — NURSE TRIAGE (OUTPATIENT)
Dept: FAMILY MEDICINE CLINIC | Facility: CLINIC | Age: 52
End: 2022-11-07

## 2022-11-07 NOTE — TELEPHONE ENCOUNTER
Nurse Triage called stating that pt started having flashing lights last night and then it happened again this afternoon. I explained that [facility] is out this week. Pt has never seen [facility] in the past. PCP is Dr. Tariq Christianson. I gave the nurse retina associates telephone number and location information. Pt will call and get an appointment this week. Sent to [facility] as IVELISSE gil he gets a retina note.

## 2022-11-14 NOTE — TELEPHONE ENCOUNTER
I called retina specialist. Pt never was seen by retina. I called pt and she hung up on me right away using the language line. ( I tried calling her again with language line and no answer) . Routed back to RENALDO.

## 2023-03-24 ENCOUNTER — OFFICE VISIT (OUTPATIENT)
Dept: FAMILY MEDICINE CLINIC | Facility: CLINIC | Age: 53
End: 2023-03-24

## 2023-03-24 VITALS
HEART RATE: 69 BPM | SYSTOLIC BLOOD PRESSURE: 105 MMHG | DIASTOLIC BLOOD PRESSURE: 71 MMHG | TEMPERATURE: 98 F | BODY MASS INDEX: 28 KG/M2 | WEIGHT: 155 LBS

## 2023-03-24 DIAGNOSIS — Z12.11 COLON CANCER SCREENING: ICD-10-CM

## 2023-03-24 DIAGNOSIS — Z00.00 ENCOUNTER FOR ANNUAL HEALTH EXAMINATION: Primary | ICD-10-CM

## 2023-03-24 DIAGNOSIS — H93.12 TINNITUS OF LEFT EAR: ICD-10-CM

## 2023-03-24 DIAGNOSIS — Z12.31 ENCOUNTER FOR SCREENING MAMMOGRAM FOR MALIGNANT NEOPLASM OF BREAST: ICD-10-CM

## 2023-03-24 DIAGNOSIS — N89.8 VAGINAL DRYNESS: ICD-10-CM

## 2023-03-24 PROCEDURE — 3074F SYST BP LT 130 MM HG: CPT | Performed by: FAMILY MEDICINE

## 2023-03-24 PROCEDURE — 3078F DIAST BP <80 MM HG: CPT | Performed by: FAMILY MEDICINE

## 2023-03-24 PROCEDURE — 99396 PREV VISIT EST AGE 40-64: CPT | Performed by: FAMILY MEDICINE

## 2023-03-24 RX ORDER — LEVOCETIRIZINE DIHYDROCHLORIDE 5 MG/1
5 TABLET, FILM COATED ORAL EVERY EVENING
Qty: 30 TABLET | Refills: 0 | Status: SHIPPED | OUTPATIENT
Start: 2023-03-24

## 2023-03-28 DIAGNOSIS — E78.2 MIXED HYPERLIPIDEMIA: ICD-10-CM

## 2023-03-28 DIAGNOSIS — R73.03 PREDIABETES: ICD-10-CM

## 2023-03-29 RX ORDER — FENOFIBRATE 54 MG/1
TABLET ORAL
Qty: 90 TABLET | Refills: 1 | Status: SHIPPED | OUTPATIENT
Start: 2023-03-29

## 2023-03-29 NOTE — TELEPHONE ENCOUNTER
Protocol failed or has No Protocol, please review  Requested Prescriptions   Pending Prescriptions Disp Refills    FENOFIBRATE 54 MG Oral Tab [Pharmacy Med Name: FENOFIBRATE 54 MG TABLET] 90 tablet 1     Sig: TAKE 1 TABLET BY MOUTH EVERY DAY       Cholesterol Medication Protocol Failed - 3/28/2023  4:35 PM        Failed - ALT in past 12 months        Failed - LDL in past 12 months        Failed - Last ALT < 80     Lab Results   Component Value Date    ALT 61 (H) 02/22/2022             Failed - Last LDL < 130     Lab Results   Component Value Date     (H) 02/22/2022             Passed - In person appointment or virtual visit in the past 12 mos or appointment in next 3 mos     Recent Outpatient Visits              5 days ago Encounter for annual health examination    Navid Santiago MD    Office Visit    1 year ago Prediabetes    Navid Santiago MD    Office Visit    1 year ago Encounter for annual health examination    Navid Santiago MD    Office Visit    1 year ago [address] [facility], [place] [address], Dashawn Luo [facility], APRN    Office Visit    2 years ago Mixed hyperlipidemia    [address], [facility], Navid Soares MD    Virtual Phone E/M                        METFORMIN 500 MG Oral Tab [Pharmacy Med Name: METFORMIN  MG TABLET] 180 tablet 1     Sig: TAKE 1 TABLET BY MOUTH TWICE A DAY       Diabetes Medication Protocol Failed - 3/28/2023  4:35 PM        Failed - Last A1C < 7.5 and within past 6 months     Lab Results   Component Value Date    A1C 6.2 (H) 02/22/2022             Failed - EGFRCR or GFRNAA > 50     GFR Evaluation            Failed - GFR in the past 12 months        Passed - In person appointment or virtual visit in the past 6 mos or appointment in next 3 mos     Recent Outpatient Visits              5 days ago [facility]    1 year [facility]    Office Visit    1 year ago Encounter for annual health examination    [facility], Esther Fajardo MD    Office Visit    1 year ago [address], Dashawn Lorrie Ket, APRN    Office Visit    2 years ago Mixed hyperlipidemia    [facility], Esther Fajardo MD    Virtual Phone E/M                           Recent Outpatient Visits              5 days ago Encounter for annual health examination    [facility]    Office Visit    1 year ago Prediabetes    [facility], Esther Fajardo MD    Office Visit    1 year ago Encounter for annual health examination    [facility]    Office Visit    1 year ago [address], [place] Lorrie Ket, APRN    Office Visit    2 years ago Mixed hyperlipidemia    [facility], Esther Fajardo MD    Virtual Phone E/M

## 2023-04-08 ENCOUNTER — LAB ENCOUNTER (OUTPATIENT)
Dept: LAB | Age: 53
End: 2023-04-08
Attending: FAMILY MEDICINE
Payer: COMMERCIAL

## 2023-04-08 DIAGNOSIS — Z00.00 ENCOUNTER FOR ANNUAL HEALTH EXAMINATION: ICD-10-CM

## 2023-04-08 LAB
ALBUMIN SERPL-MCNC: 3.8 G/DL (ref 3.4–5)
ALBUMIN/GLOB SERPL: 1 {RATIO} (ref 1–2)
ALP LIVER SERPL-CCNC: 91 U/L
ALT SERPL-CCNC: 86 U/L
ANION GAP SERPL CALC-SCNC: 4 MMOL/L (ref 0–18)
AST SERPL-CCNC: 76 U/L (ref 15–37)
BASOPHILS # BLD AUTO: 0.02 X10(3) UL (ref 0–0.2)
BASOPHILS NFR BLD AUTO: 0.5 %
BILIRUB SERPL-MCNC: 0.5 MG/DL (ref 0.1–2)
BUN BLD-MCNC: 10 MG/DL (ref 7–18)
BUN/CREAT SERPL: 16.1 (ref 10–20)
CALCIUM BLD-MCNC: 9.4 MG/DL (ref 8.5–10.1)
CHLORIDE SERPL-SCNC: 108 MMOL/L (ref 98–112)
CHOLEST SERPL-MCNC: 139 MG/DL (ref ?–200)
CO2 SERPL-SCNC: 28 MMOL/L (ref 21–32)
CREAT BLD-MCNC: 0.62 MG/DL
DEPRECATED RDW RBC AUTO: 45.5 FL (ref 35.1–46.3)
EOSINOPHIL # BLD AUTO: 0.06 X10(3) UL (ref 0–0.7)
EOSINOPHIL NFR BLD AUTO: 1.4 %
ERYTHROCYTE [DISTWIDTH] IN BLOOD BY AUTOMATED COUNT: 13.2 % (ref 11–15)
EST. AVERAGE GLUCOSE BLD GHB EST-MCNC: 126 MG/DL (ref 68–126)
FASTING PATIENT LIPID ANSWER: YES
FASTING STATUS PATIENT QL REPORTED: YES
GFR SERPLBLD BASED ON 1.73 SQ M-ARVRAT: 106 ML/MIN/1.73M2 (ref 60–?)
GLOBULIN PLAS-MCNC: 3.9 G/DL (ref 2.8–4.4)
GLUCOSE BLD-MCNC: 104 MG/DL (ref 70–99)
HBA1C MFR BLD: 6 % (ref ?–5.7)
HCT VFR BLD AUTO: 39 %
HDLC SERPL-MCNC: 40 MG/DL (ref 40–59)
HGB BLD-MCNC: 12.8 G/DL
IMM GRANULOCYTES # BLD AUTO: 0.01 X10(3) UL (ref 0–1)
IMM GRANULOCYTES NFR BLD: 0.2 %
LDLC SERPL CALC-MCNC: 80 MG/DL (ref ?–100)
LYMPHOCYTES # BLD AUTO: 1.76 X10(3) UL (ref 1–4)
LYMPHOCYTES NFR BLD AUTO: 41.6 %
MCH RBC QN AUTO: 30.7 PG (ref 26–34)
MCHC RBC AUTO-ENTMCNC: 32.8 G/DL (ref 31–37)
MCV RBC AUTO: 93.5 FL
MONOCYTES # BLD AUTO: 0.34 X10(3) UL (ref 0.1–1)
MONOCYTES NFR BLD AUTO: 8 %
NEUTROPHILS # BLD AUTO: 2.04 X10 (3) UL (ref 1.5–7.7)
NEUTROPHILS # BLD AUTO: 2.04 X10(3) UL (ref 1.5–7.7)
NEUTROPHILS NFR BLD AUTO: 48.3 %
NONHDLC SERPL-MCNC: 99 MG/DL (ref ?–130)
OSMOLALITY SERPL CALC.SUM OF ELEC: 289 MOSM/KG (ref 275–295)
PLATELET # BLD AUTO: 229 10(3)UL (ref 150–450)
POTASSIUM SERPL-SCNC: 3.9 MMOL/L (ref 3.5–5.1)
PROT SERPL-MCNC: 7.7 G/DL (ref 6.4–8.2)
RBC # BLD AUTO: 4.17 X10(6)UL
SODIUM SERPL-SCNC: 140 MMOL/L (ref 136–145)
TRIGL SERPL-MCNC: 101 MG/DL (ref 30–149)
TSI SER-ACNC: 2.03 MIU/ML (ref 0.36–3.74)
VLDLC SERPL CALC-MCNC: 16 MG/DL (ref 0–30)
WBC # BLD AUTO: 4.2 X10(3) UL (ref 4–11)

## 2023-04-08 PROCEDURE — 85025 COMPLETE CBC W/AUTO DIFF WBC: CPT

## 2023-04-08 PROCEDURE — 84443 ASSAY THYROID STIM HORMONE: CPT

## 2023-04-08 PROCEDURE — 83036 HEMOGLOBIN GLYCOSYLATED A1C: CPT

## 2023-04-08 PROCEDURE — 80053 COMPREHEN METABOLIC PANEL: CPT

## 2023-04-08 PROCEDURE — 36415 COLL VENOUS BLD VENIPUNCTURE: CPT

## 2023-04-08 PROCEDURE — 80061 LIPID PANEL: CPT

## 2023-05-19 PROBLEM — Z01.419 WELL WOMAN EXAM WITH ROUTINE GYNECOLOGICAL EXAM: Status: ACTIVE | Noted: 2023-05-19

## 2023-05-19 PROBLEM — N89.8 VAGINAL DRYNESS: Status: ACTIVE | Noted: 2023-05-19

## 2023-05-22 ENCOUNTER — OFFICE VISIT (OUTPATIENT)
Dept: OBGYN CLINIC | Facility: CLINIC | Age: 53
End: 2023-05-22

## 2023-05-22 VITALS
BODY MASS INDEX: 28.64 KG/M2 | WEIGHT: 155.63 LBS | HEIGHT: 62 IN | SYSTOLIC BLOOD PRESSURE: 116 MMHG | DIASTOLIC BLOOD PRESSURE: 78 MMHG

## 2023-05-22 DIAGNOSIS — N89.8 VAGINAL ODOR: ICD-10-CM

## 2023-05-22 DIAGNOSIS — Z01.419 WELL WOMAN EXAM WITH ROUTINE GYNECOLOGICAL EXAM: Primary | ICD-10-CM

## 2023-05-22 DIAGNOSIS — N89.8 VAGINAL DRYNESS: ICD-10-CM

## 2023-05-22 LAB — TRICHOMONAS SCREEN: NEGATIVE

## 2023-05-22 PROCEDURE — 88175 CYTOPATH C/V AUTO FLUID REDO: CPT | Performed by: OBSTETRICS & GYNECOLOGY

## 2023-05-22 PROCEDURE — 87808 TRICHOMONAS ASSAY W/OPTIC: CPT | Performed by: OBSTETRICS & GYNECOLOGY

## 2023-05-22 PROCEDURE — 87624 HPV HI-RISK TYP POOLED RSLT: CPT | Performed by: OBSTETRICS & GYNECOLOGY

## 2023-05-22 PROCEDURE — 87205 SMEAR GRAM STAIN: CPT | Performed by: OBSTETRICS & GYNECOLOGY

## 2023-05-22 PROCEDURE — 87106 FUNGI IDENTIFICATION YEAST: CPT | Performed by: OBSTETRICS & GYNECOLOGY

## 2023-05-23 LAB — HPV I/H RISK 1 DNA SPEC QL NAA+PROBE: NEGATIVE

## 2023-05-24 ENCOUNTER — HOSPITAL ENCOUNTER (OUTPATIENT)
Dept: MAMMOGRAPHY | Age: 53
Discharge: HOME | End: 2023-05-24
Attending: FAMILY MEDICINE
Payer: COMMERCIAL

## 2023-05-24 DIAGNOSIS — Z12.31 ENCOUNTER FOR SCREENING MAMMOGRAM FOR MALIGNANT NEOPLASM OF BREAST: ICD-10-CM

## 2023-05-24 LAB — TRICHOMONAS SCREEN: NEGATIVE

## 2023-05-24 PROCEDURE — 77063 BREAST TOMOSYNTHESIS BI: CPT | Performed by: FAMILY MEDICINE

## 2023-05-24 PROCEDURE — 77067 SCR MAMMO BI INCL CAD: CPT | Performed by: FAMILY MEDICINE

## 2023-05-26 ENCOUNTER — HOSPITAL ENCOUNTER (OUTPATIENT)
Age: 53
Discharge: HOME | End: 2023-05-26
Payer: COMMERCIAL

## 2023-05-26 VITALS
RESPIRATION RATE: 18 BRPM | SYSTOLIC BLOOD PRESSURE: 134 MMHG | OXYGEN SATURATION: 98 % | HEART RATE: 92 BPM | DIASTOLIC BLOOD PRESSURE: 84 MMHG | TEMPERATURE: 98 F

## 2023-05-26 DIAGNOSIS — N30.01 ACUTE CYSTITIS WITH HEMATURIA: Primary | ICD-10-CM

## 2023-05-26 LAB
BILIRUB UR QL STRIP: NEGATIVE
COLOR UR: YELLOW
GLUCOSE UR STRIP-MCNC: NEGATIVE MG/DL
KETONES UR STRIP-MCNC: NEGATIVE MG/DL
NITRITE UR QL STRIP: NEGATIVE
PH UR STRIP: 7 [PH]
PROT UR STRIP-MCNC: 30 MG/DL
SP GR UR STRIP: 1.01
UROBILINOGEN UR STRIP-ACNC: <2 MG/DL

## 2023-05-26 PROCEDURE — 87186 SC STD MICRODIL/AGAR DIL: CPT | Performed by: NURSE PRACTITIONER

## 2023-05-26 PROCEDURE — 87077 CULTURE AEROBIC IDENTIFY: CPT | Performed by: NURSE PRACTITIONER

## 2023-05-26 PROCEDURE — 87086 URINE CULTURE/COLONY COUNT: CPT | Performed by: NURSE PRACTITIONER

## 2023-05-26 RX ORDER — CEFADROXIL 500 MG/1
500 CAPSULE ORAL 2 TIMES DAILY
Qty: 14 CAPSULE | Refills: 0 | Status: SHIPPED | OUTPATIENT
Start: 2023-05-26 | End: 2023-06-02

## 2023-05-26 NOTE — ED INITIAL ASSESSMENT (HPI)
Pt with c/o frequency with urination, urinating small amounts since yesterday. C/o suprapubic pain. Pt states urine with foul odor. Denies flank pain/hematuria/vaginal discharge/fever.

## 2023-05-26 NOTE — DISCHARGE INSTRUCTIONS
You have a bladder infection. Antibiotics twice a day for 7 days have been prescribed. Please increase your fluid intake to 80 to 100 ounces of water a day. Take full course of antibiotics, do not stop antibiotics even if you feel improved. Go to ER if you have any new or worsening pain, blood in urine, inability to urinate, back pain with fevers above 100.4 and vomiting.

## 2023-06-12 ENCOUNTER — OFFICE VISIT (OUTPATIENT)
Dept: AUDIOLOGY | Facility: CLINIC | Age: 53
End: 2023-06-12

## 2023-06-12 ENCOUNTER — OFFICE VISIT (OUTPATIENT)
Dept: OTOLARYNGOLOGY | Facility: CLINIC | Age: 53
End: 2023-06-12
Payer: COMMERCIAL

## 2023-06-12 DIAGNOSIS — H93.19 TINNITUS, UNSPECIFIED LATERALITY: Primary | ICD-10-CM

## 2023-06-12 DIAGNOSIS — H93.13 TINNITUS OF BOTH EARS: Primary | ICD-10-CM

## 2023-06-12 PROCEDURE — 92556 SPEECH AUDIOMETRY COMPLETE: CPT | Performed by: AUDIOLOGIST

## 2023-06-12 PROCEDURE — 92567 TYMPANOMETRY: CPT | Performed by: AUDIOLOGIST

## 2023-06-12 PROCEDURE — 92552 PURE TONE AUDIOMETRY AIR: CPT | Performed by: AUDIOLOGIST

## 2023-06-28 ENCOUNTER — OFFICE VISIT (OUTPATIENT)
Dept: FAMILY MEDICINE CLINIC | Facility: CLINIC | Age: 53
End: 2023-06-28

## 2023-06-28 VITALS
DIASTOLIC BLOOD PRESSURE: 75 MMHG | HEART RATE: 78 BPM | BODY MASS INDEX: 28.89 KG/M2 | WEIGHT: 157 LBS | HEIGHT: 62 IN | SYSTOLIC BLOOD PRESSURE: 116 MMHG

## 2023-06-28 DIAGNOSIS — R30.0 DYSURIA: Primary | ICD-10-CM

## 2023-06-28 LAB
APPEARANCE: CLEAR
BILIRUBIN: NEGATIVE
GLUCOSE (URINE DIPSTICK): NEGATIVE MG/DL
KETONES (URINE DIPSTICK): NEGATIVE MG/DL
LEUKOCYTES: NEGATIVE
MULTISTIX LOT#: NORMAL NUMERIC
NITRITE, URINE: NEGATIVE
OCCULT BLOOD: NEGATIVE
PH, URINE: 7 (ref 4.5–8)
PROTEIN (URINE DIPSTICK): NEGATIVE MG/DL
SPECIFIC GRAVITY: 1.01 (ref 1–1.03)
URINE-COLOR: YELLOW
UROBILINOGEN,SEMI-QN: 1 MG/DL (ref 0–1.9)

## 2023-06-28 PROCEDURE — 3074F SYST BP LT 130 MM HG: CPT | Performed by: PHYSICIAN ASSISTANT

## 2023-06-28 PROCEDURE — 3008F BODY MASS INDEX DOCD: CPT | Performed by: PHYSICIAN ASSISTANT

## 2023-06-28 PROCEDURE — 99213 OFFICE O/P EST LOW 20 MIN: CPT | Performed by: PHYSICIAN ASSISTANT

## 2023-06-28 PROCEDURE — 81002 URINALYSIS NONAUTO W/O SCOPE: CPT | Performed by: PHYSICIAN ASSISTANT

## 2023-06-28 PROCEDURE — 3078F DIAST BP <80 MM HG: CPT | Performed by: PHYSICIAN ASSISTANT

## 2023-06-29 ENCOUNTER — LAB ENCOUNTER (OUTPATIENT)
Dept: LAB | Age: 53
End: 2023-06-29
Attending: PHYSICIAN ASSISTANT
Payer: COMMERCIAL

## 2023-06-29 DIAGNOSIS — R30.0 DYSURIA: ICD-10-CM

## 2023-06-29 LAB
BILIRUB UR QL STRIP.AUTO: NEGATIVE
CLARITY UR REFRACT.AUTO: CLEAR
COLOR UR AUTO: YELLOW
GLUCOSE UR STRIP.AUTO-MCNC: NEGATIVE MG/DL
KETONES UR STRIP.AUTO-MCNC: NEGATIVE MG/DL
LEUKOCYTE ESTERASE UR QL STRIP.AUTO: NEGATIVE
NITRITE UR QL STRIP.AUTO: NEGATIVE
PH UR STRIP.AUTO: 5 [PH] (ref 5–8)
PROT UR STRIP.AUTO-MCNC: NEGATIVE MG/DL
RBC UR QL AUTO: NEGATIVE
SP GR UR STRIP.AUTO: 1.01 (ref 1–1.03)
UROBILINOGEN UR STRIP.AUTO-MCNC: <2 MG/DL

## 2023-06-29 PROCEDURE — 81003 URINALYSIS AUTO W/O SCOPE: CPT

## 2023-07-28 ENCOUNTER — TELEPHONE (OUTPATIENT)
Dept: FAMILY MEDICINE CLINIC | Facility: CLINIC | Age: 53
End: 2023-07-28

## 2023-07-28 NOTE — TELEPHONE ENCOUNTER
Pt was called and appt was made for 7/31/2023      Future Appointments   Date Time Provider Adelina Calderon   7/31/2023 10:00 AM Ally Freeman MD [facility] ADO   8/18/2023  3:00 PM [facility]   9/21/2023  3:30 PM Corina Bautista PA-C [facility]

## 2023-07-28 NOTE — TELEPHONE ENCOUNTER
Using language line : Xochitl Mcmahon ID number [id number]    Per visit 6/28/23:  Dysuria   This is a new problem. Episode onset: 4 days. The problem occurs every urination. The problem has been unchanged. The quality of the pain is described as burning and aching. There has been no fever. There is No history of pyelonephritis. Associated symptoms include frequency. Pertinent negatives include no chills, discharge, flank pain, hematuria, nausea or vomiting. She has tried increased fluids for the symptoms. Urine results:   URINALYSIS WITH CULTURE REFLEX  Order: [id number]  Collected 6/29/2023  7:00 AM       Status: Final result       Dx: Dysuria    Specimen Information: Urine   2 Result Notes       1 Patient Communication      Component  Ref Range & Units 6/29/23  7:00 AM   Urine Color  Yellow Yellow   Clarity Urine  Clear Clear   Spec Gravity  1.001 - 1.030 1.015   Glucose Urine  Negative mg/dL Negative   Bilirubin Urine  Negative Negative   Ketones Urine  Negative mg/dL Negative   Blood Urine  Negative Negative   pH Urine  5.0 - 8.0 5.0   Protein Urine  Negative mg/dL Negative   Urobilinogen Urine  <2.0 mg/dL <2.0   Nitrite Urine  Negative Negative   Leukocyte Esterase Urine  Negative Negative   Microscopic Microscopic not indicated   Resulting Agency [facility])              Specimen Collected: 06/29/23  7:00 AM Last Resulted: 06/29/23  7:25 PM             Patient states she is still having urinary frequency. Denies any other urinary symptoms including burning or urgency. Patient reports back pain only when laying down. Afebrile. Previous work up negative for urinary tract infection. Dr. Montse Contreras: Patient adamant she does not want to see another provider. She requested to be added to your first available and asked RN To forward message to see if you can add her on sooner.  Patient also added to wait list.

## 2023-07-31 ENCOUNTER — OFFICE VISIT (OUTPATIENT)
Dept: FAMILY MEDICINE CLINIC | Facility: CLINIC | Age: 53
End: 2023-07-31

## 2023-07-31 VITALS
HEART RATE: 60 BPM | WEIGHT: 157 LBS | BODY MASS INDEX: 29 KG/M2 | SYSTOLIC BLOOD PRESSURE: 108 MMHG | TEMPERATURE: 98 F | DIASTOLIC BLOOD PRESSURE: 73 MMHG

## 2023-07-31 DIAGNOSIS — N30.00 ACUTE CYSTITIS WITHOUT HEMATURIA: Primary | ICD-10-CM

## 2023-07-31 DIAGNOSIS — Z12.11 COLON CANCER SCREENING: ICD-10-CM

## 2023-07-31 DIAGNOSIS — R53.83 FATIGUE, UNSPECIFIED TYPE: ICD-10-CM

## 2023-07-31 DIAGNOSIS — R35.0 URINARY FREQUENCY: ICD-10-CM

## 2023-07-31 LAB
APPEARANCE: CLEAR
BILIRUBIN: NEGATIVE
GLUCOSE (URINE DIPSTICK): NEGATIVE MG/DL
KETONES (URINE DIPSTICK): NEGATIVE MG/DL
MULTISTIX EXPIRATION DATE: ABNORMAL DATE
MULTISTIX LOT#: ABNORMAL NUMERIC
NITRITE, URINE: NEGATIVE
PH, URINE: 7 (ref 4.5–8)
PROTEIN (URINE DIPSTICK): NEGATIVE MG/DL
SPECIFIC GRAVITY: 1.02 (ref 1–1.03)
URINE-COLOR: YELLOW
UROBILINOGEN,SEMI-QN: 0.2 MG/DL (ref 0–1.9)

## 2023-07-31 PROCEDURE — 3078F DIAST BP <80 MM HG: CPT | Performed by: FAMILY MEDICINE

## 2023-07-31 PROCEDURE — 3074F SYST BP LT 130 MM HG: CPT | Performed by: FAMILY MEDICINE

## 2023-07-31 PROCEDURE — 81003 URINALYSIS AUTO W/O SCOPE: CPT | Performed by: FAMILY MEDICINE

## 2023-07-31 PROCEDURE — 99214 OFFICE O/P EST MOD 30 MIN: CPT | Performed by: FAMILY MEDICINE

## 2023-08-01 ENCOUNTER — TELEPHONE (OUTPATIENT)
Dept: FAMILY MEDICINE CLINIC | Facility: CLINIC | Age: 53
End: 2023-08-01

## 2023-08-02 ENCOUNTER — TELEPHONE (OUTPATIENT)
Dept: FAMILY MEDICINE CLINIC | Facility: CLINIC | Age: 53
End: 2023-08-02

## 2023-08-02 DIAGNOSIS — N30.00 ACUTE CYSTITIS WITHOUT HEMATURIA: Primary | ICD-10-CM

## 2023-08-02 NOTE — TELEPHONE ENCOUNTER
With assist of LL, pt called requesting test results, asking about a medication mentioned at [address] frequent urination,urinating a small amount     3.  Urinary frequency  - will try OAB meds before having her see urogyn.  - OP REFERRAL TO UROGYNECOLOGY CLINIC

## 2023-08-03 RX ORDER — SULFAMETHOXAZOLE AND TRIMETHOPRIM 800; 160 MG/1; MG/1
1 TABLET ORAL 2 TIMES DAILY
Qty: 6 TABLET | Refills: 0 | Status: SHIPPED | OUTPATIENT
Start: 2023-08-03 | End: 2023-08-06

## 2023-08-03 NOTE — TELEPHONE ENCOUNTER
Spoke to patient. Discussed UTI resolved. Sending Bactrim to pharmacy. Precautions discussed. Acute cystitis without hematuria  - sulfamethoxazole-trimethoprim -160 MG Oral Tab per tablet; Take 1 tablet by mouth 2 (two) times daily for 3 days. Dispense: 6 tablet;  Refill: 0

## 2023-09-21 ENCOUNTER — TELEPHONE (OUTPATIENT)
Facility: CLINIC | Age: 53
End: 2023-09-21

## 2023-09-21 ENCOUNTER — OFFICE VISIT (OUTPATIENT)
Facility: CLINIC | Age: 53
End: 2023-09-21

## 2023-09-21 VITALS
HEIGHT: 62 IN | SYSTOLIC BLOOD PRESSURE: 148 MMHG | BODY MASS INDEX: 28.71 KG/M2 | DIASTOLIC BLOOD PRESSURE: 84 MMHG | WEIGHT: 156 LBS | HEART RATE: 77 BPM

## 2023-09-21 DIAGNOSIS — Z12.11 COLON CANCER SCREENING: Primary | ICD-10-CM

## 2023-09-21 PROCEDURE — 3008F BODY MASS INDEX DOCD: CPT | Performed by: PHYSICIAN ASSISTANT

## 2023-09-21 PROCEDURE — 3077F SYST BP >= 140 MM HG: CPT | Performed by: PHYSICIAN ASSISTANT

## 2023-09-21 PROCEDURE — S0285 CNSLT BEFORE SCREEN COLONOSC: HCPCS | Performed by: PHYSICIAN ASSISTANT

## 2023-09-21 PROCEDURE — 3079F DIAST BP 80-89 MM HG: CPT | Performed by: PHYSICIAN ASSISTANT

## 2023-09-21 NOTE — H&P
[facility], [facility] - Gastroenterology                                                                                                               Reason for consult: Patient presents with:  Colonoscopy Screening: Hemorrhoids ,denies any bleeding       Requesting physician or provider: Yuri Shaver MD      HPI:   Corby Borja is a 46year old year-old female with history of HLD, DM who presents for crc screening. she moves her bowels 1-2 times per day and without recent change. Does endorse straining on occasion. she denies brbpr and/or melena. She is working on incorporating more fiber and increasing water. Believes she has hemorrhoids, but no brbpr or melena. she denies acid reflux and/or heartburn. she denies dysphagia, odynophagia and/or globus. she denies abdominal pain. she denies nausea and/or vomiting. she denies recent change in appetite and/or unintentional weight loss. she denies bloating.     NSAIDS: PRN  Tobacco: none  Alcohol: none  Marijuana: none  Illicit drugs: none    FH GI malignancy-  none  FH celiac dz- none  FH liver dz- none  FH IBD- none    No history of adverse reaction to sedation  No MARTINE  No anticoagulants  No pacemaker/defibrillator  No pain medications and/or sleep aides      Last colonoscopy: none  Last EGD: none    Wt Readings from Last 6 Encounters:  09/21/23 : 156 lb (70.8 kg)  07/31/23 : 157 lb (71.2 kg)  06/28/23 : 157 lb (71.2 kg)  05/22/23 : 155 lb 9.6 oz (70.6 kg)  03/24/23 : 155 lb (70.3 kg)  02/28/22 : 165 lb (74.8 kg)       History, Medications, Allergies, ROS:      Past Medical History:   Diagnosis Date    Hemorrhoids     Hyperlipidemia     Pre-diabetes       Past Surgical History:   Procedure Laterality Date    TUBAL LIGATION        Family Hx:   Family History   Problem Relation Age of Onset    Diabetes Mother     Ovarian Cancer Sister 44    Breast Cancer Neg       Social History:   Social History     Socioeconomic History    Marital status:    Tobacco Use    Smoking status: Never    Smokeless tobacco: Never   Vaping Use    Vaping Use: Never used   Substance and Sexual Activity    Alcohol use: Yes     Comment: very seldom    Drug use: No        Medications (Active prior to today's visit):  Current Outpatient Medications   Medication Sig Dispense Refill    FENOFIBRATE 54 MG Oral Tab TAKE 1 TABLET BY MOUTH EVERY DAY 90 tablet 1    METFORMIN 500 MG Oral Tab TAKE 1 TABLET BY MOUTH TWICE A  tablet 1    ATORVASTATIN CALCIUM OR Take by mouth. (Patient not taking: Reported on 7/31/2023)         Allergies:  No Known Allergies    ROS:   CONSTITUTIONAL: negative for fevers, chills, sweats and weight loss  EYES Negative for red eyes, yellow eyes, changes in vision  HEENT: Negative for dysphagia and hoarseness  RESPIRATORY: Negative for cough and shortness of breath  CARDIOVASCULAR: Negative for chest pain, palpitations  GASTROINTESTINAL: See HPI  GENITOURINARY: Negative for dysuria and frequency  MUSCULOSKELETAL: Negative for arthralgias and myalgias  NEUROLOGICAL: Negative for dizziness and headaches  BEHAVIOR/PSYCH: Negative for anxiety and poor appetite    PHYSICAL EXAM:   Blood pressure 148/84, pulse 77, height 5' 2\" (1.575 m), weight 156 lb (70.8 kg). GEN: WD/WN, NAD  HEENT: Supple symmetrical, trachea midline  CV: RRR, the extremities are warm and well perfused   LUNGS: No increased work of breathing  ABDOMEN: No scars, normal bowel sounds, soft, non-tender, non-distended no rebound or guarding, no masses, no hepatomegaly  MSK: No redness, no warmth, no swelling of joints  SKIN: No jaundice, no erythema, no rashes  HEMATOLOGIC: No bleeding, no bruising  NEURO: Alert and interactive, normal gait    Labs/Imaging/Procedures:     Patient's pertinent labs and imaging were reviewed and discussed with patient today.      Lab Results   Component Value Date    WBC 4.2 04/08/2023    RBC 4.17 04/08/2023    HGB 12.8 04/08/2023 HCT 39.0 04/08/2023    MCV 93.5 04/08/2023    MCH 30.7 04/08/2023    MCHC 32.8 04/08/2023    RDW 13.2 04/08/2023    .0 04/08/2023    MPV 10.5 (H) 08/18/2018        Lab Results   Component Value Date     (H) 04/08/2023    BUN 10 04/08/2023    BUNCREA 16.1 04/08/2023    CREATSERUM 0.62 04/08/2023    ANIONGAP 4 04/08/2023    GFRNAA 101 02/22/2022    GFRAA 116 02/22/2022    CA 9.4 04/08/2023    OSMOCALC 289 04/08/2023    ALKPHO 91 04/08/2023    AST 76 (H) 04/08/2023    ALT 86 (H) 04/08/2023    BILT 0.5 04/08/2023    TP 7.7 04/08/2023    ALB 3.8 04/08/2023    GLOBULIN 3.9 04/08/2023     04/08/2023    K 3.9 04/08/2023     04/08/2023    CO2 28.0 04/08/2023        No results found. .  ASSESSMENT/PLAN:   Bernadine Parrish is a 46year old year-old female with history of HLD, DM who presents for crc screening. #crc screening  She is here today as a referral from her PCP for evaluation prior to undergoing colonoscopy for CRC screening. She denies red flags such as recent change in bm, brbpr, and/or melena. She denies a FHx GI malignancy. She has not had a colonoscopy and so is now due for CRC screening. We discussed the available screening options for CRC such as FIT, cologuard, and CT colonography as well as efficacy of these modalities. They are electing to pursue cln at this time. 1. Schedule colonoscopy with Dr. Graciela Borja or Dr. Carmen Hogue with [facility] [Diagnosis: crc screening]    2.  bowel prep from pharmacy (split Volleely)    3. Hold metformin day before and day of.     4. Read all bowel prep instructions carefully. Bowel prep instructions can also be found online at:  www.health.org/giprep     5. AVOID seeds, nuts, popcorn, raw fruits and vegetables for 3 days before procedure    6. You MAY need to go for COVID testing 72 hours before procedure. The testing team will call you a few days before your procedure to discuss with you if testing is required.  If you are asked to go for COVID testing and do not completed the test, the procedure cannot be performed. 7. If you start any NEW medication after your visit today, please notify us. Certain medications (like iron or weight loss medications) will need to be held before the procedure, or the procedure cannot be performed safely. Orders This Visit:  No orders of the defined types were placed in this encounter. Meds This Visit:  Requested Prescriptions     Pending Prescriptions Disp Refills    polyethylene glycol, PEG 3350-KCl-NaBcb-NaCl-NaSulf, 236 g Oral Recon Soln 1 each 0     Sig: Take 4,000 mL by mouth As Directed. Take 2,000 mL the night before your procedure and 2,000 mL the morning of your procedure. Imaging & Referrals:  None    ENDOSCOPIC RISK BENEFIT DISCUSSION: I described the procedure in great detail with the patient. I discussed the risks and benefits, including but not limited to: bleeding, perforation, infection, anesthesia complications, and even death. Patient will be NPO after midnight and will have a person physically present at time of pick-up to drive patient home. Patient verbalized understanding and agrees to proceed with procedure as planned. Ruthie Sharp PA-C   9/21/2023        This note was partially prepared using [employer] [facility] [employer] voice recognition dictation software. As a result, errors may occur. When identified, these errors have been corrected.  While every attempt is made to correct errors during dictation, discrepancies may still exist.

## 2023-09-21 NOTE — PATIENT INSTRUCTIONS
1. Schedule colonoscopy with Dr. Waqas Burnham or Dr. Destini An with [facility] [Diagnosis: crc screening]    2.  bowel prep from pharmacy (split golytely)    3. Hold metformin day before and day of.     4. Read all bowel prep instructions carefully. Bowel prep instructions can also be found online at:  www.health.org/giprep     5. AVOID seeds, nuts, popcorn, raw fruits and vegetables for 3 days before procedure    6. You MAY need to go for COVID testing 72 hours before procedure. The testing team will call you a few days before your procedure to discuss with you if testing is required. If you are asked to go for COVID testing and do not completed the test, the procedure cannot be performed. 7. If you start any NEW medication after your visit today, please notify us. Certain medications (like iron or weight loss medications) will need to be held before the procedure, or the procedure cannot be performed safely.

## 2023-09-21 NOTE — TELEPHONE ENCOUNTER
Scheduled for:  Colonoscopy [address] , [address] [facility]  Provider Name:    Date: 2/12/2024   Location:  [facility]  Sedation:  Mac   Time:  12:30 Pm (pt is aware to arrive at 11;30 Am )   Prep:  Golytely Portuguese Prep instructions were given to pt in the office, I discuss prep Instructions with patient at the time of the appointment which she  verbally understood and given the prep instructions at the time of the appointment  Meds/Allergies Reconciled?:  Physician reviewed     Diagnosis with codes:  Colon cancer screening Z12.11  Was patient informed to call insurance with codes (Y/N):  Yes, I confirmed [employer] [place] insurance with the patient. The patient also verbally understands to call her insurance to check for pre-cert, codes were given on prep instructions. Referral sent?:  Referral was sent at the time of electronic surgical scheduling. [address] or [address] notified?:  I sent an electronic request to Endo Scheduling and received a confirmation today. Medication Orders: This patient verbally confirmed that she is not taking:   Iron, blood thinners, BP meds, and is  diabetic   Hold metformin day before and day of the procedure    Not latex allergy, Not PCN allergy and does not have a pacemaker Pt is aware to NOT take iron pills, herbal meds and diet supplements for 7 days before exam. Also to NOT take any form of alcohol, recreational drugs and any forms of ED meds 24 hours before exam.    Misc Orders:  Patient verbally understood & will await a phone call from [facility] to schedule. Further instructions given by staff:   Patient was informed about the new cancellation policy for his/her procedure. Patient was also given a copy of the cancellation policy at the time of the appointment and verbalized understanding.

## 2023-11-21 DIAGNOSIS — E78.2 MIXED HYPERLIPIDEMIA: ICD-10-CM

## 2023-11-22 NOTE — TELEPHONE ENCOUNTER
Please review. Protocol failed / No protocol.     Requested Prescriptions   Pending Prescriptions Disp Refills    FENOFIBRATE 54 MG Oral Tab [Pharmacy Med Name: FENOFIBRATE 54 MG TABLET] 90 tablet 1     Sig: TAKE 1 TABLET BY MOUTH EVERY DAY       Cholesterol Medication Protocol Failed - 11/21/2023 12:03 AM        Failed - Last ALT < 80     Lab Results   Component Value Date    ALT 86 (H) 04/08/2023             Passed - ALT in past 12 months        Passed - LDL in past 12 months        Passed - Last LDL < 130     Lab Results   Component Value Date    LDL 80 04/08/2023             Passed - In person appointment or virtual visit in the past 12 mos or appointment in next 3 mos     Recent Outpatient Visits              2 months ago Colon cancer screening    [address] [facility], [place], [place]    Office Visit    3 months ago Acute cystitis without hematuria    [facility], [address], Benjamin Meek MD    Office Visit    4 months ago [facility], [address], [place], [place]    Office Visit    5 months ago Tinnitus of both ears    [address], [address] [facility] [facility]    Office Visit    5 months ago Tinnitus, unspecified laterality    [address] [facility], Hayde Ernst MD    Office Visit          Future Appointments         Provider Department Appt Notes    In 2 months [address], [address] [facility] @[facility]                    Future Appointments         Provider Department Appt Notes    In 2 months [address], [address] [facility] Colon with [facility] @[email or web address]            Recent Outpatient Visits              2 months ago Colon cancer screening    [address] [facility], [place], [place]    Office Visit    3 months ago Acute cystitis without hematuria    [facility], [address], Naeem Andrews MD    Office Visit    4 months ago [address] [facility], [address], [place], [employer]    Office Visit    5 months ago Tinnitus of both ears    [address] [facility],[address], [address] [facility],[address], [facility]    Office Visit    5 months ago Tinnitus, unspecified laterality    [address] [facility], Yoshi Emmanuel MD    Office Visit

## 2023-11-24 RX ORDER — FENOFIBRATE 54 MG/1
54 TABLET ORAL DAILY
Qty: 90 TABLET | Refills: 3 | Status: SHIPPED | OUTPATIENT
Start: 2023-11-24

## 2024-02-12 ENCOUNTER — ANESTHESIA EVENT (OUTPATIENT)
Dept: ENDOSCOPY | Age: 54
End: 2024-02-12
Payer: COMMERCIAL

## 2024-02-12 ENCOUNTER — ANESTHESIA (OUTPATIENT)
Dept: ENDOSCOPY | Age: 54
End: 2024-02-12
Payer: COMMERCIAL

## 2024-02-12 ENCOUNTER — HOSPITAL ENCOUNTER (OUTPATIENT)
Age: 54
Setting detail: HOSPITAL OUTPATIENT SURGERY
Discharge: HOME | End: 2024-02-12
Attending: INTERNAL MEDICINE | Admitting: INTERNAL MEDICINE
Payer: COMMERCIAL

## 2024-02-12 VITALS
DIASTOLIC BLOOD PRESSURE: 70 MMHG | OXYGEN SATURATION: 99 % | SYSTOLIC BLOOD PRESSURE: 103 MMHG | WEIGHT: 155 LBS | HEART RATE: 64 BPM | HEIGHT: 62 IN | RESPIRATION RATE: 12 BRPM | BODY MASS INDEX: 28.52 KG/M2

## 2024-02-12 DIAGNOSIS — Z12.11 COLON CANCER SCREENING: ICD-10-CM

## 2024-02-12 LAB — GLUCOSE BLDC GLUCOMTR-MCNC: 109 MG/DL (ref 70–99)

## 2024-02-12 PROCEDURE — 82962 GLUCOSE BLOOD TEST: CPT

## 2024-02-12 RX ORDER — LIDOCAINE HYDROCHLORIDE 10 MG/ML
INJECTION, SOLUTION EPIDURAL; INFILTRATION; INTRACAUDAL; PERINEURAL AS NEEDED
Status: DISCONTINUED | OUTPATIENT
Start: 2024-02-12 | End: 2024-02-12 | Stop reason: SURG

## 2024-02-12 RX ORDER — SODIUM CHLORIDE, SODIUM LACTATE, POTASSIUM CHLORIDE, CALCIUM CHLORIDE 600; 310; 30; 20 MG/100ML; MG/100ML; MG/100ML; MG/100ML
INJECTION, SOLUTION INTRAVENOUS CONTINUOUS
Status: DISCONTINUED | OUTPATIENT
Start: 2024-02-12 | End: 2024-02-12

## 2024-02-12 RX ADMIN — LIDOCAINE HYDROCHLORIDE 50 MG: 10 INJECTION, SOLUTION EPIDURAL; INFILTRATION; INTRACAUDAL; PERINEURAL at 12:31:00

## 2024-02-12 RX ADMIN — SODIUM CHLORIDE, SODIUM LACTATE, POTASSIUM CHLORIDE, CALCIUM CHLORIDE: 600; 310; 30; 20 INJECTION, SOLUTION INTRAVENOUS at 12:31:00

## 2024-02-12 NOTE — DISCHARGE INSTRUCTIONS
Home Care Instructions for Colonoscopy with Sedation    Diet:  - Resume your regular diet as tolerated unless otherwise instructed.  - Start with light meals to minimize bloating.  - Do not drink alcohol today.    Medication:  - If you have questions about resuming your normal medications, please contact your Primary Care Physician.    Activities:  - Take it easy today. Do not return to work today.  - Do not drive today.  - Do not operate any machinery today (including kitchen equipment).    Colonoscopy:  - You may notice some rectal \"spotting\" (a little blood on the toilet tissue) for a day or two after the exam. This is normal.  - If you experience any rectal bleeding (not spotting), persistent tenderness or sharp severe abdominal pains, oral temperature over 100 degrees Fahrenheit, light-headedness or dizziness, or any other problems, contact your doctor.    **If unable to reach your doctor, please go to the [facility] Emergency Room**    - Your referring physician will receive a full report of your examination.  - If you do not hear from your doctor's office within two weeks of your biopsy, please call them for your results.    You may be able to see your laboratory results in [employer] between 4 and 7 business days.  In some cases, your physician may not have viewed the results before they are released to [employer].  If you have questions regarding your results contact the physician who ordered the test/exam by phone or via [employer] by choosing \"Ask a Medical Question.\"

## 2024-02-12 NOTE — OPERATIVE REPORT
[facility] Endoscopy Report  Date of procedure-February 12, 2024    Preoperative Diagnosis:  -Colorectal cancer screening      Postoperative Diagnosis:  -Diverticulosis  -Internal hemorrhoids      Procedure:    Colonoscopy       Surgeon:  Josias Cassidy M.D.    Anesthesia:  MAC    Technique:  After informed consent, the patient was placed in the left lateral recumbent position.  Digital rectal examination revealed no palpable intraluminal abnormalities.  An Olympus variable stiffness 190 series HD colonoscope was inserted into the rectum and advanced under direct vision by following the lumen to the cecum.  The colon was examined upon withdrawal in the left lateral position.    The procedure was well tolerated without immediate complication.      Findings:  The preparation of the colon was good.  The terminal ileum was examined for 4 cm and visually normal.  The ileocecal valve was well preserved. The visualized colonic mucosa from the cecum to the anal verge was normal with an intact vascular pattern.    Diverticulosis located in the right side of the colon, no evidence of diverticulitis.    Small internal hemorrhoids noted on retroflexed view.    Estimated blood loss-none  Specimens-none      Impression:  -Diverticulosis  -Internal hemorrhoids    Recommendations:  - Post procedure instructions given  - Repeat colonoscopy in 10 years  - High fiber diet for diverticular disease  - Symptomatic treatment of hemorrhoids          Josias Cassidy MD  2/12/2024  1:01 PM

## 2024-02-12 NOTE — H&P
History & Physical Examination    Patient Name: Estevan Alba  MRN: [id number]  CSN: [id number]  YOB: 1970    Diagnosis:   CRC screening      Medications Prior to Admission   Medication Sig Dispense Refill Last Dose    Fenofibrate 54 MG Oral Tab Take 1 tablet (54 mg total) by mouth daily. 90 tablet 3 2/10/2024 at 0900    METFORMIN 500 MG Oral Tab TAKE 1 TABLET BY MOUTH TWICE A  tablet 1 2/9/2024 at 0900    polyethylene glycol, PEG 3350-KCl-NaBcb-NaCl-NaSulf, 236 g Oral Recon Soln Take 4,000 mL by mouth As Directed. Take 2,000 mL the night before your procedure and 2,000 mL the morning of your procedure. 1 each 0      Current Facility-Administered Medications   Medication Dose Route Frequency    lactated ringers infusion   Intravenous Continuous       Allergies: No Known Allergies    Past Medical History:   Diagnosis Date    Disorder of liver     Hemorrhoids     High cholesterol     Hyperlipidemia     Pre-diabetes     Prediabetes      Past Surgical History:   Procedure Laterality Date    TUBAL LIGATION       Family History   Problem Relation Age of Onset    Diabetes Mother     Ovarian Cancer Sister 39    Breast Cancer Neg      Social History     Tobacco Use    Smoking status: Never    Smokeless tobacco: Never   Substance Use Topics    Alcohol use: Yes     Comment: very seldom       SYSTEM Check if Review is Normal Check if Physical Exam is Normal If not normal, please explain:   HEENT [x ] [ x]    NECK & BACK [x ] [x ]    HEART [x ] [ x]    LUNGS [x ] [ x]    ABDOMEN [x ] [x ]    UROGENITAL [ ] [ ]    EXTREMITIES [x ] [x ]    OTHER        [ x ] I have discussed the risks and benefits and alternatives with the patient/family.  They understand and agree to proceed with plan of care.  [ x ] I have reviewed the History and Physical done within the last 30 days.  Any changes noted above.    Josias Cassidy MD  2/12/2024  12:20 PM
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
## 2024-02-12 NOTE — ANESTHESIA POSTPROCEDURE EVALUATION
Patient: Estevan Skeltonampo    Procedure Summary       Date: 02/12/24 Room / Location: [facility] ENDOSCOPY 01 / Pending sale to [facility] ENDO    Anesthesia Start: 1231 Anesthesia Stop: 1303    Procedure: COLONOSCOPY Diagnosis:       Colon cancer screening      (diverticulosis, hemorrhoids)    Surgeons: Josias Cassidy MD Anesthesiologist: Jas Garrett MD    Anesthesia Type: MAC ASA Status: 1            Anesthesia Type: MAC    Vitals Value Taken Time   /68 02/12/24 1301   Temp  02/12/24 1303   Pulse 85 02/12/24 1301   Resp 20 02/12/24 1301   SpO2 100 % 02/12/24 1301       [facility] AN Post Evaluation:   Patient Evaluated in PACU  Patient Participation: complete - patient participated  Level of Consciousness: awake and alert  Pain Management: adequate  Airway Patency:patent  Yes    Nausea/Vomiting: none  Cardiovascular Status: acceptable  Respiratory Status: acceptable and room air  Postoperative Hydration acceptable      Jas Garrett MD  2/12/2024 1:03 PM

## 2024-02-14 ENCOUNTER — MED REC SCAN ONLY (OUTPATIENT)
Dept: GASTROENTEROLOGY | Facility: CLINIC | Age: 54
End: 2024-02-14

## 2024-08-05 ENCOUNTER — HOSPITAL ENCOUNTER (OUTPATIENT)
Dept: MAMMOGRAPHY | Age: 54
Discharge: HOME | End: 2024-08-05
Attending: FAMILY MEDICINE
Payer: COMMERCIAL

## 2024-08-05 DIAGNOSIS — Z12.31 ENCOUNTER FOR SCREENING MAMMOGRAM FOR MALIGNANT NEOPLASM OF BREAST: ICD-10-CM

## 2024-08-05 PROCEDURE — 77067 SCR MAMMO BI INCL CAD: CPT | Performed by: FAMILY MEDICINE

## 2024-08-05 PROCEDURE — 77063 BREAST TOMOSYNTHESIS BI: CPT | Performed by: FAMILY MEDICINE

## 2024-09-05 ENCOUNTER — OFFICE VISIT (OUTPATIENT)
Dept: FAMILY MEDICINE CLINIC | Facility: CLINIC | Age: 54
End: 2024-09-05

## 2024-09-05 VITALS
DIASTOLIC BLOOD PRESSURE: 80 MMHG | WEIGHT: 165 LBS | HEART RATE: 87 BPM | SYSTOLIC BLOOD PRESSURE: 119 MMHG | BODY MASS INDEX: 30 KG/M2

## 2024-09-05 DIAGNOSIS — Z00.00 ENCOUNTER FOR ANNUAL HEALTH EXAMINATION: Primary | ICD-10-CM

## 2024-09-05 PROCEDURE — 99396 PREV VISIT EST AGE 40-64: CPT | Performed by: FAMILY MEDICINE

## 2024-09-05 PROCEDURE — 3079F DIAST BP 80-89 MM HG: CPT | Performed by: FAMILY MEDICINE

## 2024-09-05 PROCEDURE — 3074F SYST BP LT 130 MM HG: CPT | Performed by: FAMILY MEDICINE

## 2024-09-05 NOTE — PROGRESS NOTES
HPI:   Estevan Alba is a 54 year old female who presents for a complete physical exam.    Worried about hbd who is having paranoid thoughts.     Wt Readings from Last 3 Encounters:   09/05/24 165 lb (74.8 kg)   02/12/24 155 lb (70.3 kg)   09/21/23 156 lb (70.8 kg)     Body mass index is 30.18 kg/m².       Current Outpatient Medications   Medication Sig Dispense Refill    Fenofibrate 54 MG Oral Tab Take 1 tablet (54 mg total) by mouth daily. 90 tablet 3    METFORMIN 500 MG Oral Tab TAKE 1 TABLET BY MOUTH TWICE A DAY (Patient not taking: Reported on 9/5/2024) 180 tablet 1      Past Medical History:    Disorder of liver    Hemorrhoids    High cholesterol    Hyperlipidemia    Pre-diabetes    Prediabetes      Past Surgical History:   Procedure Laterality Date    Colonoscopy N/A 2/12/2024    Procedure: COLONOSCOPY;  Surgeon: Josias Cassidy MD;  Location: [facility] ENDO    Tubal ligation        Family History   Problem Relation Age of Onset    Diabetes Mother     Ovarian Cancer Sister 39    Breast Cancer Neg       Social History:   Social History     Socioeconomic History    Marital status:    Tobacco Use    Smoking status: Never     Passive exposure: Never    Smokeless tobacco: Never   Vaping Use    Vaping status: Never Used   Substance and Sexual Activity    Alcohol use: Not Currently     Comment: very seldom    Drug use: No          REVIEW OF SYSTEMS:   Negative, except per HPI.     EXAM:   /80 (BP Location: Right arm, Patient Position: Sitting, Cuff Size: adult)   Pulse 87   Wt 165 lb (74.8 kg)   BMI 30.18 kg/m²     GENERAL: well developed, well nourished, in no apparent distress  SKIN: no rashes, no suspicious lesions  HEENT: atraumatic, normocephalic, ears are clear  EYES: PERRLA, EOMI,conjunctiva are clear  NECK: supple, no adenopathy  LUNGS: clear to auscultation  CARDIO: RRR without murmur  GI: good BS, no masses or tenderness  MUSCULOSKELETAL: back is not tender, FROM of the back  EXTREMITIES: no  cyanosis or edema  NEURO: Oriented times three, cranial nerves are intact, motor and sensory are grossly intact    ASSESSMENT AND PLAN:   Estevan Alba is a 54 year old female who presents for a complete physical exam.    1. Encounter for annual health examination  Mammo utd  - CBC With Differential With Platelet; Future  - Comp Metabolic Panel (14); Future  - Hemoglobin A1C; Future  - Lipid Panel; Future  - TSH W Reflex To Free T4; Future       Linda Merrill MD  9/5/2024  3:47 PM

## 2024-09-21 ENCOUNTER — LAB ENCOUNTER (OUTPATIENT)
Dept: LAB | Age: 54
End: 2024-09-21
Attending: FAMILY MEDICINE
Payer: COMMERCIAL

## 2024-09-21 DIAGNOSIS — Z00.00 ENCOUNTER FOR ANNUAL HEALTH EXAMINATION: ICD-10-CM

## 2024-09-21 LAB
ALBUMIN SERPL-MCNC: 4.6 G/DL (ref 3.2–4.8)
ALBUMIN/GLOB SERPL: 1.4 {RATIO} (ref 1–2)
ALP LIVER SERPL-CCNC: 97 U/L
ALT SERPL-CCNC: 79 U/L
ANION GAP SERPL CALC-SCNC: 7 MMOL/L (ref 0–18)
AST SERPL-CCNC: 80 U/L (ref ?–34)
BASOPHILS # BLD AUTO: 0.04 X10(3) UL (ref 0–0.2)
BASOPHILS NFR BLD AUTO: 0.9 %
BILIRUB SERPL-MCNC: 0.5 MG/DL (ref 0.3–1.2)
BUN BLD-MCNC: 13 MG/DL (ref 9–23)
BUN/CREAT SERPL: 20.3 (ref 10–20)
CALCIUM BLD-MCNC: 9.9 MG/DL (ref 8.7–10.4)
CHLORIDE SERPL-SCNC: 107 MMOL/L (ref 98–112)
CHOLEST SERPL-MCNC: 163 MG/DL (ref ?–200)
CO2 SERPL-SCNC: 27 MMOL/L (ref 21–32)
CREAT BLD-MCNC: 0.64 MG/DL
DEPRECATED RDW RBC AUTO: 48.1 FL (ref 35.1–46.3)
EGFRCR SERPLBLD CKD-EPI 2021: 104 ML/MIN/1.73M2 (ref 60–?)
EOSINOPHIL # BLD AUTO: 0.08 X10(3) UL (ref 0–0.7)
EOSINOPHIL NFR BLD AUTO: 1.8 %
ERYTHROCYTE [DISTWIDTH] IN BLOOD BY AUTOMATED COUNT: 13.9 % (ref 11–15)
EST. AVERAGE GLUCOSE BLD GHB EST-MCNC: 140 MG/DL (ref 68–126)
FASTING PATIENT LIPID ANSWER: YES
FASTING STATUS PATIENT QL REPORTED: YES
GLOBULIN PLAS-MCNC: 3.2 G/DL (ref 2–3.5)
GLUCOSE BLD-MCNC: 115 MG/DL (ref 70–99)
HBA1C MFR BLD: 6.5 % (ref ?–5.7)
HCT VFR BLD AUTO: 40.2 %
HDLC SERPL-MCNC: 35 MG/DL (ref 40–59)
HGB BLD-MCNC: 13.3 G/DL
IMM GRANULOCYTES # BLD AUTO: 0.01 X10(3) UL (ref 0–1)
IMM GRANULOCYTES NFR BLD: 0.2 %
LDLC SERPL CALC-MCNC: 106 MG/DL (ref ?–100)
LYMPHOCYTES # BLD AUTO: 2.07 X10(3) UL (ref 1–4)
LYMPHOCYTES NFR BLD AUTO: 47.8 %
MCH RBC QN AUTO: 31.5 PG (ref 26–34)
MCHC RBC AUTO-ENTMCNC: 33.1 G/DL (ref 31–37)
MCV RBC AUTO: 95.3 FL
MONOCYTES # BLD AUTO: 0.35 X10(3) UL (ref 0.1–1)
MONOCYTES NFR BLD AUTO: 8.1 %
NEUTROPHILS # BLD AUTO: 1.78 X10 (3) UL (ref 1.5–7.7)
NEUTROPHILS # BLD AUTO: 1.78 X10(3) UL (ref 1.5–7.7)
NEUTROPHILS NFR BLD AUTO: 41.2 %
NONHDLC SERPL-MCNC: 128 MG/DL (ref ?–130)
OSMOLALITY SERPL CALC.SUM OF ELEC: 293 MOSM/KG (ref 275–295)
PLATELET # BLD AUTO: 212 10(3)UL (ref 150–450)
POTASSIUM SERPL-SCNC: 4.1 MMOL/L (ref 3.5–5.1)
PROT SERPL-MCNC: 7.8 G/DL (ref 5.7–8.2)
RBC # BLD AUTO: 4.22 X10(6)UL
SODIUM SERPL-SCNC: 141 MMOL/L (ref 136–145)
TRIGL SERPL-MCNC: 119 MG/DL (ref 30–149)
TSI SER-ACNC: 1.7 MIU/ML (ref 0.55–4.78)
VLDLC SERPL CALC-MCNC: 20 MG/DL (ref 0–30)
WBC # BLD AUTO: 4.3 X10(3) UL (ref 4–11)

## 2024-09-21 PROCEDURE — 80053 COMPREHEN METABOLIC PANEL: CPT

## 2024-09-21 PROCEDURE — 85025 COMPLETE CBC W/AUTO DIFF WBC: CPT

## 2024-09-21 PROCEDURE — 83036 HEMOGLOBIN GLYCOSYLATED A1C: CPT

## 2024-09-21 PROCEDURE — 80061 LIPID PANEL: CPT

## 2024-09-21 PROCEDURE — 84443 ASSAY THYROID STIM HORMONE: CPT

## 2024-09-21 PROCEDURE — 36415 COLL VENOUS BLD VENIPUNCTURE: CPT

## 2024-09-30 ENCOUNTER — TELEPHONE (OUTPATIENT)
Dept: FAMILY MEDICINE CLINIC | Facility: CLINIC | Age: 54
End: 2024-09-30

## 2024-09-30 DIAGNOSIS — R73.03 PREDIABETES: Primary | ICD-10-CM

## 2024-09-30 RX ORDER — METFORMIN HCL 500 MG
500 TABLET, EXTENDED RELEASE 24 HR ORAL DAILY
Qty: 90 TABLET | Refills: 1 | Status: SHIPPED | OUTPATIENT
Start: 2024-09-30

## 2024-12-14 DIAGNOSIS — E78.2 MIXED HYPERLIPIDEMIA: ICD-10-CM

## 2024-12-18 RX ORDER — FENOFIBRATE 54 MG/1
54 TABLET ORAL DAILY
Qty: 90 TABLET | Refills: 3 | Status: SHIPPED | OUTPATIENT
Start: 2024-12-18

## 2024-12-18 NOTE — TELEPHONE ENCOUNTER
Refill passed per [facility] protocols.    Requested Prescriptions   Pending Prescriptions Disp Refills    FENOFIBRATE 54 MG Oral Tab [Pharmacy Med Name: FENOFIBRATE 54 MG TABLET] 90 tablet 3     Sig: TAKE 1 TABLET BY MOUTH DAILY       Cholesterol Medication Protocol Passed - 12/18/2024 11:22 AM        Passed - ALT < 80     Lab Results   Component Value Date    ALT 79 (H) 09/21/2024             Passed - ALT resulted within past year        Passed - Lipid panel within past 12 months     Lab Results   Component Value Date    CHOLEST 163 09/21/2024    TRIG 119 09/21/2024    HDL 35 (L) 09/21/2024     (H) 09/21/2024    VLDL 20 09/21/2024    NONHDLC 128 09/21/2024             Passed - In person appointment or virtual visit in the past 12 mos or appointment in next 3 mos     Recent Outpatient Visits              3 months ago Encounter for annual health examination    [facility], Linda Valdez MD    Office Visit    1 year ago Colon cancer screening    [facility], Mile Mane PA-C    Office Visit    1 year ago Acute cystitis without hematuria    [facility], Linda Valdez MD    Office Visit    1 year ago Dysuria    [facility], Lombard Nguyen, Minhxuyen, PA-C    Office Visit    1 year ago Tinnitus of both ears    [facility], Priscila Garcia AUD    Office Visit          Future Appointments         Provider Department Appt Notes    In 2 weeks [facility]

## 2025-01-02 ENCOUNTER — OFFICE VISIT (OUTPATIENT)
Dept: FAMILY MEDICINE CLINIC | Facility: CLINIC | Age: 55
End: 2025-01-02

## 2025-01-02 VITALS
BODY MASS INDEX: 29 KG/M2 | DIASTOLIC BLOOD PRESSURE: 80 MMHG | HEART RATE: 68 BPM | WEIGHT: 160 LBS | SYSTOLIC BLOOD PRESSURE: 124 MMHG

## 2025-01-02 DIAGNOSIS — R79.89 ELEVATED LFTS: ICD-10-CM

## 2025-01-02 DIAGNOSIS — N30.00 ACUTE CYSTITIS WITHOUT HEMATURIA: Primary | ICD-10-CM

## 2025-01-02 DIAGNOSIS — R73.03 PREDIABETES: ICD-10-CM

## 2025-01-02 LAB
APPEARANCE: CLEAR
BILIRUBIN: NEGATIVE
GLUCOSE (URINE DIPSTICK): NEGATIVE MG/DL
KETONES (URINE DIPSTICK): NEGATIVE MG/DL
LEUKOCYTES: NEGATIVE
MULTISTIX LOT#: NORMAL NUMERIC
NITRITE, URINE: NEGATIVE
OCCULT BLOOD: NEGATIVE
PH, URINE: 6.5 (ref 4.5–8)
PROTEIN (URINE DIPSTICK): NEGATIVE MG/DL
SPECIFIC GRAVITY: 1.01 (ref 1–1.03)
URINE-COLOR: YELLOW
UROBILINOGEN,SEMI-QN: 0.2 MG/DL (ref 0–1.9)

## 2025-01-02 PROCEDURE — 99214 OFFICE O/P EST MOD 30 MIN: CPT | Performed by: FAMILY MEDICINE

## 2025-01-02 PROCEDURE — 81003 URINALYSIS AUTO W/O SCOPE: CPT | Performed by: FAMILY MEDICINE

## 2025-01-02 NOTE — PROGRESS NOTES
Chief Complaint   Patient presents with    Urinary Symptoms     Concerned w/ new odor     Body ache and/or chills     C/o intermittent chills and hot flashes      Follow - Up     DM     HPI:   Estevan Abla is a 54 year old female who presents to clinic with complaints of foul-smelling urine without dysuria.  Denies any fevers, chills, nausea, vomiting or flank pain.    Does report hot flashes similar to when she was going through menopause.    REVIEW OF SYSTEMS:   Negative, except per HPI.     EXAM:   /80 (BP Location: Right arm, Patient Position: Sitting, Cuff Size: adult)   Pulse 68   Wt 160 lb (72.6 kg)   BMI 29.26 kg/m²   Body mass index is 29.26 kg/m².  GENERAL: well developed, well nourished, in no apparent distress  SKIN: no rashes, no suspicious lesions  HEENT: atraumatic, normocephalic  EYES: PERRLA, EOMI,conjunctiva are clear  NECK: supple, no adenopathy  LUNGS: clear to auscultation  CARDIO: RRR without murmur    ASSESSMENT AND PLAN:     1. Acute cystitis without hematuria  - URINALYSIS, AUTO, W/O SCOPE    2. Prediabetes  - Hemoglobin A1C; Future    3. Elevated LFTs  - Hepatic Function Panel (7) [E]; Future     RTC if no improvement in symptoms. Red flags discussed to go to ER.     Linda Merrill MD  1/2/2025  5:30 PM

## 2025-01-03 ENCOUNTER — LAB ENCOUNTER (OUTPATIENT)
Dept: LAB | Age: 55
End: 2025-01-03
Attending: FAMILY MEDICINE
Payer: COMMERCIAL

## 2025-01-03 DIAGNOSIS — R73.03 PREDIABETES: ICD-10-CM

## 2025-01-03 DIAGNOSIS — R79.89 ELEVATED LFTS: ICD-10-CM

## 2025-01-03 LAB
ALBUMIN SERPL-MCNC: 4.6 G/DL (ref 3.2–4.8)
ALP LIVER SERPL-CCNC: 78 U/L
ALT SERPL-CCNC: 65 U/L
AST SERPL-CCNC: 69 U/L (ref ?–34)
BILIRUB DIRECT SERPL-MCNC: 0.2 MG/DL (ref ?–0.3)
BILIRUB SERPL-MCNC: 0.5 MG/DL (ref 0.3–1.2)
EST. AVERAGE GLUCOSE BLD GHB EST-MCNC: 134 MG/DL (ref 68–126)
HBA1C MFR BLD: 6.3 % (ref ?–5.7)
PROT SERPL-MCNC: 7.6 G/DL (ref 5.7–8.2)

## 2025-01-03 PROCEDURE — 36415 COLL VENOUS BLD VENIPUNCTURE: CPT

## 2025-01-03 PROCEDURE — 80076 HEPATIC FUNCTION PANEL: CPT

## 2025-01-03 PROCEDURE — 83036 HEMOGLOBIN GLYCOSYLATED A1C: CPT

## 2025-03-25 ENCOUNTER — APPOINTMENT (OUTPATIENT)
Dept: GENERAL RADIOLOGY | Facility: HOSPITAL | Age: 55
End: 2025-03-25
Attending: EMERGENCY MEDICINE
Payer: COMMERCIAL

## 2025-03-25 ENCOUNTER — HOSPITAL ENCOUNTER (EMERGENCY)
Facility: HOSPITAL | Age: 55
Discharge: HOME | End: 2025-03-25
Attending: EMERGENCY MEDICINE
Payer: COMMERCIAL

## 2025-03-25 VITALS
WEIGHT: 164 LBS | OXYGEN SATURATION: 98 % | TEMPERATURE: 97 F | HEART RATE: 65 BPM | RESPIRATION RATE: 18 BRPM | SYSTOLIC BLOOD PRESSURE: 114 MMHG | BODY MASS INDEX: 30 KG/M2 | DIASTOLIC BLOOD PRESSURE: 67 MMHG

## 2025-03-25 DIAGNOSIS — S32.000A LUMBAR COMPRESSION FRACTURE, CLOSED, INITIAL ENCOUNTER (HCC): Primary | ICD-10-CM

## 2025-03-25 PROCEDURE — 72100 X-RAY EXAM L-S SPINE 2/3 VWS: CPT | Performed by: EMERGENCY MEDICINE

## 2025-03-25 PROCEDURE — 99283 EMERGENCY DEPT VISIT LOW MDM: CPT

## 2025-03-25 PROCEDURE — 99284 EMERGENCY DEPT VISIT MOD MDM: CPT

## 2025-03-25 RX ORDER — ACETAMINOPHEN 500 MG
1000 TABLET ORAL ONCE
Status: COMPLETED | OUTPATIENT
Start: 2025-03-25 | End: 2025-03-25

## 2025-03-25 RX ORDER — CYCLOBENZAPRINE HCL 10 MG
5 TABLET ORAL NIGHTLY
Qty: 3 TABLET | Refills: 0 | Status: SHIPPED | OUTPATIENT
Start: 2025-03-25 | End: 2025-03-31

## 2025-03-25 NOTE — ED INITIAL ASSESSMENT (HPI)
Patient presents to the ED c/o tailbone pain after falling at work. Denies hitting head. Denies blood thinner use.

## 2025-03-25 NOTE — ED PROVIDER NOTES
Patient Seen in: [facility] Emergency Department    History     Chief Complaint   Patient presents with    Fall       HPI    55-year-old female who presents to the emergency department given that she slipped just prior to arrival on a slippery surface at work she states, fell onto her lower back, denies any head injury or any weakness or numbness or paresthesias.  No neck pain.  Took ibuprofen 600 mg prior to arrival.  No bowel or bladder incontinence.    History reviewed.   Past Medical History:    Disorder of liver    Hemorrhoids    High cholesterol    Hyperlipidemia    Pre-diabetes    Prediabetes       History reviewed.   Past Surgical History:   Procedure Laterality Date    Colonoscopy N/A 2/12/2024    Procedure: COLONOSCOPY;  Surgeon: Josias Cassidy MD;  Location: [facility] ENDO    Tubal ligation           Medications :  Prescriptions Prior to Admission[1]     Family History   Problem Relation Age of Onset    Diabetes Mother     Ovarian Cancer Sister 39    Breast Cancer Neg        Smoking Status:   Social History     Socioeconomic History    Marital status:    Tobacco Use    Smoking status: Never     Passive exposure: Never    Smokeless tobacco: Never   Vaping Use    Vaping status: Never Used   Substance and Sexual Activity    Alcohol use: Not Currently     Comment: very seldom    Drug use: No       Constitutional and vital signs reviewed.      Social History and Family History elements reviewed from today, pertinent positives to the presenting problem noted.    Physical Exam     ED Triage Vitals [03/25/25 1234]   /83   Pulse 66   Resp 18   Temp 96.9 °F (36.1 °C)   Temp src Temporal   SpO2 98 %   O2 Device None (Room air)       All measures to prevent infection transmission during my interaction with the patient were taken. The patient was already wearing a droplet mask on my arrival to the room. Personal protective equipment was worn throughout the duration of the exam.  Handwashing was  performed prior to and after the exam.  Stethoscope and any equipment used during my examination was cleaned with super sani-cloth germicidal wipes following the exam.     Physical Exam    General: NAD  Head: Normocephalic and atraumatic.  Mouth/Throat/Ears/Nose: No hoarseness of voice  Eyes: Conjunctivae and EOM are normal.  Neck: Normal range of motion. Supple. No cervical ttp.   Cardiovascular: Normal rate  Respiratory/Chest: No tachypnea.  Gastrointestinal: Nondistended  Musculoskeletal:No swelling or deformity.   Back: Bilateral hip flexion, knee extension, plantarflexion 5 out of 5 strength.  Bilateral sensation intact to light touch throughout the lower extremities.  + midline lumbar ttp.   . No posterior rib ttp. Normal gait.    Neurological: Alert and appropriate.   Skin: Skin is warm and dry. No pallor.  Psychiatric: Has a normal mood and affect.      ED Course      Labs Reviewed - No data to display    As Interpreted by me    Imaging Results Available and Reviewed while in ED: XR LUMBAR SPINE (MIN 2 VIEWS) (CPT=72100)    Result Date: 3/25/2025  CONCLUSION:  Chronicity-indeterminate mild superior endplate compression fracture deformity of L1. If clinically warranted, follow-up MRI can be obtained.    Dictated by (CST): Shady Lagos MD on 3/25/2025 at 1:50 PM     Finalized by (CST): Shady Lagos MD on 3/25/2025 at 1:52 PM         ED Medications Administered:   Medications   acetaminophen (Tylenol Extra Strength) tab 1,000 mg (1,000 mg Oral Given 3/25/25 1305)         MDM     Vitals:    03/25/25 1234   BP: 129/83   Pulse: 66   Resp: 18   Temp: 96.9 °F (36.1 °C)   TempSrc: Temporal   SpO2: 98%   Weight: 74.4 kg     *I personally reviewed and interpreted all ED vitals.    Pulse Ox: 98%, Room air, Normal          Medical Decision Making      Differential Diagnosis/ Diagnostic Considerations: Lumbar contusion, fracture    Complicating Factors: The patient already has does not have any pertinent problems on  file. to contribute to the complexity of this ED evaluation.    I reviewed prior chart records including office note from January 2, 2025.  X-rays notable for mild compression fracture of lumbar spine on my interpretation.  Considered admission however pain is controlled.  Occupational health referral provided as well as IR clinical referral for discussion on kyphoplasty.    Dc In stable condition.  Patient and daughter are comfortable with the plan.    Prescriptions: Flexeril, discussed ibuprofen and Tylenol.  Declined opioids      Disposition and Plan     Clinical Impression:  1. Lumbar compression fracture, closed, initial encounter ([facility])        Disposition:  Discharge    Follow-up:  Jaison Sheth MD  [address] [facility] [address] [phone]    Schedule an appointment as soon as possible for a visit in 1 day(s)      [facility]  [address] [address] [phone]  Schedule an appointment as soon as possible for a visit in 1 day(s)      Linda Merrill MD  [address]  [address]  [facility] [place] [facility] [address] [phone]    Schedule an appointment as soon as possible for a visit in 1 day(s)        Medications Prescribed:  Current Discharge Medication List        START taking these medications    Details   cyclobenzaprine 10 MG Oral Tab Take 0.5 tablets (5 mg total) by mouth nightly for 6 days.  Qty: 3 tablet, Refills: 0                              [1] (Not in a hospital admission)      No

## 2025-03-27 DIAGNOSIS — R73.03 PREDIABETES: ICD-10-CM

## 2025-03-31 RX ORDER — METFORMIN HYDROCHLORIDE 500 MG/1
500 TABLET, EXTENDED RELEASE ORAL DAILY
Qty: 90 TABLET | Refills: 3 | Status: SHIPPED | OUTPATIENT
Start: 2025-03-31

## 2025-03-31 NOTE — TELEPHONE ENCOUNTER
Please Review. Protocol Failed; No Protocol     Requested Prescriptions   Pending Prescriptions Disp Refills    METFORMIN  MG Oral Tablet 24 Hr [Pharmacy Med Name: METFORMIN HCL  MG TABLET] 90 tablet 1     Sig: Take 1 tablet (500 mg total) by mouth daily. tome con comida       Diabetes Medication Protocol Failed - 3/31/2025  2:40 PM        Failed - Microalbumin procedure in past 12 months or taking ACE/ARB        Passed - Last A1C < 7.5 and within past 6 months     Lab Results   Component Value Date    A1C 6.3 (H) 01/03/2025

## 2025-04-02 DIAGNOSIS — S32.010A COMPRESSION FRACTURE OF L1 VERTEBRA, INITIAL ENCOUNTER (HCC): Primary | ICD-10-CM

## 2025-04-02 RX ORDER — TRAMADOL HYDROCHLORIDE 50 MG/1
50 TABLET ORAL EVERY 6 HOURS PRN
Qty: 15 TABLET | Refills: 0 | Status: SHIPPED | OUTPATIENT
Start: 2025-04-02

## 2025-04-07 ENCOUNTER — TELEPHONE (OUTPATIENT)
Dept: FAMILY MEDICINE CLINIC | Facility: CLINIC | Age: 55
End: 2025-04-07

## 2025-04-07 ENCOUNTER — HOSPITAL ENCOUNTER (OUTPATIENT)
Dept: MRI IMAGING | Age: 55
Discharge: HOME | End: 2025-04-07
Attending: NURSE PRACTITIONER
Payer: OTHER MISCELLANEOUS

## 2025-04-07 DIAGNOSIS — S32.010A COMPRESSION FRACTURE OF L1 VERTEBRA, INITIAL ENCOUNTER (HCC): ICD-10-CM

## 2025-04-07 PROCEDURE — 72148 MRI LUMBAR SPINE W/O DYE: CPT | Performed by: NURSE PRACTITIONER

## 2025-04-07 NOTE — TELEPHONE ENCOUNTER
Attempted to contact patient, call was disconnected. Please transfer to ext: [phone] if patient returns call.

## 2025-04-07 NOTE — TELEPHONE ENCOUNTER
Patient went to ER on 3/25/25. Patient had an MRI done today and needs to follow up with Provider. Soonest available is 6/5, requesting sooner date. Refused other Providers.

## 2025-04-11 ENCOUNTER — TELEPHONE (OUTPATIENT)
Dept: INTERVENTIONAL RADIOLOGY/VASCULAR | Facility: HOSPITAL | Age: 55
End: 2025-04-11

## 2025-04-11 DIAGNOSIS — S32.010A COMPRESSION FRACTURE OF L1 VERTEBRA, INITIAL ENCOUNTER (HCC): Primary | ICD-10-CM

## 2025-04-11 NOTE — PROGRESS NOTES
MRI today with L 1 compression fracture amenable to kyphoplasty  Imaging reviewed with Dr Mills    Discussed elective L 1 kyphoplasty vs conservative management with patient   She would like to proceed with conservative management with a trial of physical therapy

## 2025-04-14 NOTE — TELEPHONE ENCOUNTER
Left message for patient to call back if she still needs appointment. Please assist with scheduling virtual if just wanting to discuss results.

## 2025-04-16 ENCOUNTER — TELEPHONE (OUTPATIENT)
Dept: PHYSICAL THERAPY | Age: 55
End: 2025-04-16

## 2025-04-24 ENCOUNTER — TELEPHONE (OUTPATIENT)
Dept: PHYSICAL THERAPY | Age: 55
End: 2025-04-24

## 2025-05-01 ENCOUNTER — OFFICE VISIT (OUTPATIENT)
Dept: PHYSICAL THERAPY | Age: 55
End: 2025-05-01
Attending: NURSE PRACTITIONER
Payer: OTHER MISCELLANEOUS

## 2025-05-01 DIAGNOSIS — S32.010A COMPRESSION FRACTURE OF L1 VERTEBRA, INITIAL ENCOUNTER (HCC): Primary | ICD-10-CM

## 2025-05-01 PROCEDURE — 97161 PT EVAL LOW COMPLEX 20 MIN: CPT

## 2025-05-01 NOTE — PROGRESS NOTES
SPINE EVALUATION:     Diagnosis:   Compression fracture of L1 vertebra, initial encounter (Formerly [facility]) (S32.010A) Patient:  Estevan Alba (55 year old, female)        Referring Provider: Jose Elias Gray  Today's Date   5/1/2025    Precautions:  None   Date of Evaluation: 05/01/25  Next MD visit: no appt scheduled  Date of Surgery: n/a     PATIENT SUMMARY   Summary of chief complaints: LBP  History of current condition: Pt works in kitchen at [employer].  She states that 3/25/25 she slipped and fell onto her back.  She was not able to get up and someone had to come help her get up.  She went to ER had xrays done.  She was told that she had compression fracture of lumbar spine.  She was unable to return to work due to pain for a few days.  When she saw provider, was given light duty restrictions and returned to work.  She states current symptoms include pain in center of low back.  She denies any numbness or tingling.  She feels burning that radiates up to mid shoulder blades in the evening.   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  Significant findings include: pre-diabetic, high cholesterol  Imaging/Tests: xrays, MRI   Estevan  has a past medical history of Disorder of liver, Hemorrhoids, High cholesterol, Hyperlipidemia, Pre-diabetes, and Prediabetes.  She  has a past surgical history that includes tubal ligation and colonoscopy (N/A, 2/12/2024).    ASSESSMENT  Estevan presents to physical therapy evaluation with primary c/o LBP. The results of the objective tests and measures show decreased lumbar ROM with pain, decreased LE strength, TTP  lumbar region. Functional deficits include but are not limited to standing, bending, lifting/carrying, twisting, sleeping. Signs and symptoms are consistent with diagnosis of Compression fracture of L1 vertebra, initial encounter ([facility]) (S32.010A). Pt and PT discussed evaluation findings, pathology, POC and HEP.  Pt voiced understanding and performs HEP correctly without reported pain. Skilled Physical Therapy is medically necessary to address the above impairments and reach functional goals.    OBJECTIVE:    Musculoskeletal:  Observation/Posture:     Accessory Motion:     Palpation: TTP R/L lumbar paraspinals     Special tests:   Positive SLR B     ROM and Strength:  (* denotes performed with pain)  Trunk ROM MMT (-/5)     Flex 50% (+pain)       Ext 25% (+pain)      R L R L     Side bend WFL WFL         Rotation 50% (+pain) 75%       ,   Hip   ROM MMT (-/5)    R L R L     Flex (L2)     4 4+     Ext      NT NT     Abd     3- (+pain) 3- (+pain)     ER             IR            ,   Knee   ROM MMT (-/5)    R L R L     Flex     4+ 5     Ext (L3)     4+ 5     ,   Ankle/Foot   ROM MMT (-/5)    R L R L     PF             DF (L4)             Inversion             Eversion             Grt Toe Ext (L5)               Flexibility:  LE Flexibility R L     Hip Flexor not tested not tested     Hamstrings significantly restricted (+pain) significantly restricted (+pain)     ITB not tested not tested     Piriformis mod restricted mod restricted     Quads not tested not tested     Gastroc-soleus not tested not tested       Neurological:  Sensation: grossly intact to light touch PATTI UE/LE     Deep Tendon Reflexes:       UMN:      Box's:       Clonus:       Babinski:       Peripheral Neurodynamic: WNL except     Balance and Functional Mobility:  Gait: pt ambulates on level ground with other (use comment) (decreased liz, guarded).  Balance: SLS: EO R  , EO L            Today's Treatment and Response:   Pt education was provided  on exam findings, treatment diagnosis, treatment plan, expectations, and prognosis.  Today's Treatment       5/1/2025   Spine Treatment   Therapeutic Exercise None- not approved by insurance, only eval    Total Time Of Timed Procedures 0   Total Time Of Service-Based Procedures 0   Total Treatment Time 0   HEP Next visit         Patient was instructed in and issued a HEP for: Next visit     Charges:  PT EVAL: Low Complexity,    In agreement with evaluation findings and clinical rationale, this evaluation involved LOW COMPLEXITY decision making due to no personal factors/comorbidities, 1-2 body structures involved/activity limitations, and stable symptoms as documented in the evaluation.                                                                         PLAN OF CARE:    Goals: (to be met in 8 visits)   Pt will be I with HEP to improve therapy outcome and self manage symptoms.  Pt will be able to sleep through night without wakening due to LBP.   Pt will be able to bend to don/doff shoes without LBP.   Pt will be able to lift and carry 10# or more without LBP.      Frequency / Duration: Patient will be seen 2x/week or a total of 8  visits over a 90 day period. Treatment will include: Manual Therapy; Neuromuscular Re-education; Therapeutic Exercise; Home Exercise Program instruction; Therapeutic Activities    Education or treatment limitation:     Rehab Potential: good         Patient/Family/Caregiver was advised of these findings, precautions, and treatment options and has agreed to actively participate in planning and for this course of care.    Thank you for your referral. Please co-sign or sign and return this letter via fax as soon as possible to [phone]. If you have any questions, please contact me at Dept: [phone]    Sincerely,  Electronically signed by therapist: Maira Ureña, PT  Physician's certification required: Yes  I certify the need for these services furnished under this plan of treatment  and while under my care.    X___________________________________________________ Date____________________    Certification From: 5/1/2025  To:7/30/2025

## 2025-05-06 ENCOUNTER — OFFICE VISIT (OUTPATIENT)
Dept: PHYSICAL THERAPY | Age: 55
End: 2025-05-06
Attending: NURSE PRACTITIONER
Payer: OTHER MISCELLANEOUS

## 2025-05-06 PROCEDURE — 97110 THERAPEUTIC EXERCISES: CPT

## 2025-05-06 PROCEDURE — 97112 NEUROMUSCULAR REEDUCATION: CPT

## 2025-05-06 PROCEDURE — 97140 MANUAL THERAPY 1/> REGIONS: CPT

## 2025-05-06 NOTE — PROGRESS NOTES
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    Goals (to be met in 8 visits)   Pt will be I with HEP to improve therapy outcome and self manage symptoms.  Pt will be able to sleep through night without wakening due to LBP.   Pt will be able to bend to don/doff shoes without LBP.   Pt will be able to lift and carry 10# or more without LBP.        Plan  assess response to treatment    Treatment Last 4 Visits  Treatment Day: 2       5/1/2025 5/6/2025   Spine Treatment   Therapeutic Exercise None- not approved by insurance, only eval  LTR 10x   SKTC 10x R/L  Knee to opposite shoulder stretch 5 secx10      Neuro Re-Education  Hooklying TA bracing w/ball 5 secx20   Seated SB roll out stretch 10x    Manual Therapy  Prone lumbar paraspinals MFR    Therapeutic Exercise Minutes  15   Neuro Re-Educ Minutes  15   Manual Therapy Minutes  10   Total Time Of Timed Procedures 0 40   Total Time Of Service-Based Procedures 0 0   Total Treatment Time 0 40   HEP Next visit  LTR  SKTC  Knee to opposite shoulder   Hooklying ball squeeze w/TA        HEP  LTR  SKTC  Knee to opposite shoulder   Hooklying ball squeeze w/TA    Charges  1 TE, 1 NMRE, 1 MT

## 2025-05-08 ENCOUNTER — OFFICE VISIT (OUTPATIENT)
Dept: PHYSICAL THERAPY | Age: 55
End: 2025-05-08
Attending: NURSE PRACTITIONER
Payer: OTHER MISCELLANEOUS

## 2025-05-08 PROCEDURE — 97530 THERAPEUTIC ACTIVITIES: CPT

## 2025-05-08 PROCEDURE — 97140 MANUAL THERAPY 1/> REGIONS: CPT

## 2025-05-08 PROCEDURE — 97110 THERAPEUTIC EXERCISES: CPT

## 2025-05-08 PROCEDURE — 97112 NEUROMUSCULAR REEDUCATION: CPT

## 2025-05-08 NOTE — PROGRESS NOTES
Patient: Estevan Alba (55 year old, female) Referring Provider:  Insurance:   Diagnosis: Compression fracture of L1 vertebra, initial encounter ([facility]) (S32.010A) Jose Elias Gray  WORKERS COMP   Date of Surgery: n/a Next MD visit:  N/A   Precautions:  None no appt scheduled Referral Information:    Date of Evaluation: Req: 8, Auth: 8, Exp: 7/10/2025    05/01/25 POC Auth Visits:  8       Today's Date   5/8/2025    Subjective  Pt states she had more pain yesterday and tried ice.       Pain: 8/10     Objective  see flow sheet below for treatment            Assessment  Reviewed HEP and corrected form.  Discussed proper use of abdominal brace and not using it all day at work.    Goals (to be met in 8 visits)   Pt will be I with HEP to improve therapy outcome and self manage symptoms.  Pt will be able to sleep through night without wakening due to LBP.   Pt will be able to bend to don/doff shoes without LBP.   Pt will be able to lift and carry 10# or more without LBP.          Plan  assess response to treatment    Treatment Last 4 Visits  Treatment Day: 3       5/1/2025 5/6/2025 5/8/2025   Spine Treatment   Therapeutic Exercise None- not approved by insurance, only eval  LTR 10x   SKTC 10x R/L  Knee to opposite shoulder stretch 5 secx10    Nustep L3 x 5 min  LTR 10x   SKTC w/towel 10x R/L  Knee to opposite shoulder 5 secx 10 R/L   Neuro Re-Education  Hooklying TA bracing w/ball 5 secx20   Seated SB roll out stretch 10x  Hooklying TA bracing w/ball squeeze 5 secx20  Bridges w/TA 10x    Manual Therapy  Prone lumbar paraspinals MFR  Prone lumbar paraspinals MFR R/L   Therapeutic Activity   Educated pt on appropriate use of abdominal brace and not wearing it all day   Therapeutic Exercise Minutes  15 20   Neuro Re-Educ Minutes  15 8   Manual Therapy Minutes  10 8   Therapeutic Activity Minutes   8   Total Time Of Timed Procedures 0 40 44   Total Time Of Service-Based Procedures 0 0 0   Total Treatment Time 0 40 44   HEP Next  visit  LTR  SKTC  Knee to opposite shoulder   Hooklying ball squeeze w/TA         HEP  LTR  SKTC  Knee to opposite shoulder   Hooklying ball squeeze w/TA    Charges  1 TE, 1 NMRE, 1 MT, 1 TA

## 2025-05-12 ENCOUNTER — APPOINTMENT (OUTPATIENT)
Dept: PHYSICAL THERAPY | Age: 55
End: 2025-05-12
Attending: NURSE PRACTITIONER
Payer: OTHER MISCELLANEOUS

## 2025-05-13 ENCOUNTER — OFFICE VISIT (OUTPATIENT)
Dept: PHYSICAL THERAPY | Age: 55
End: 2025-05-13
Attending: NURSE PRACTITIONER
Payer: OTHER MISCELLANEOUS

## 2025-05-13 PROCEDURE — 97112 NEUROMUSCULAR REEDUCATION: CPT

## 2025-05-13 PROCEDURE — 97110 THERAPEUTIC EXERCISES: CPT

## 2025-05-13 PROCEDURE — 97140 MANUAL THERAPY 1/> REGIONS: CPT

## 2025-05-13 PROCEDURE — 97530 THERAPEUTIC ACTIVITIES: CPT

## 2025-05-13 NOTE — PROGRESS NOTES
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  She was very sore after last visit and on Sunday after going to a park for a walk.       Pain: 8/10     Objective  see flow sheet below for treatment            Assessment  Discussed bracing for transfers, bending, and walking to improve lumbar stabilization.    Goals (to be met in 8 visits)   Pt will be I with HEP to improve therapy outcome and self manage symptoms.  Pt will be able to sleep through night without wakening due to LBP.   Pt will be able to bend to don/doff shoes without LBP.   Pt will be able to lift and carry 10# or more without LBP.            Plan  assess response to treatment    Treatment Last 4 Visits  Treatment Day: 4       5/1/2025 5/6/2025 5/8/2025 5/13/2025   Spine Treatment   Therapeutic Exercise None- not approved by insurance, only eval  LTR 10x   SKTC 10x R/L  Knee to opposite shoulder stretch 5 secx10    Nustep L3 x 5 min  LTR 10x   SKTC w/towel 10x R/L  Knee to opposite shoulder 5 secx 10 R/L Nustep L5 x 5 min  Supine knee to opposite shoulder stretch [id number] R/L  Supine sciatic nerve glides 10x R/L  Prone hip ext 10x R/L   Neuro Re-Education  Hooklying TA bracing w/ball 5 secx20   Seated SB roll out stretch 10x  Hooklying TA bracing w/ball squeeze 5 secx20  Bridges w/TA 10x  Hooklying bracing w/ball squeeze 5 secx20   Bridges w/TA 10x2  Clamshells 20x R/L  SLR w/TA 10x2 R/L   Manual Therapy  Prone lumbar paraspinals MFR  Prone lumbar paraspinals MFR R/L Prone lumbar paraspinals  MFR   Therapeutic Activity   Educated pt on appropriate use of abdominal brace and not wearing it all day Pt education on bracing when going for walks, transfers, bending   Therapeutic Exercise Minutes  15 20 15   Neuro Re-Educ Minutes  15 8 15   Manual Therapy Minutes  10 8 8   Therapeutic Activity Minutes   8 8   Total Time Of Timed Procedures 0 40 44 46   Total Time Of Service-Based Procedures 0 0 0 0   Total Treatment Time 0 40 44 46   HEP Next visit  LTR  SKTC  Knee to opposite shoulder   Hooklying ball squeeze w/TA          HEP  LTR  SKTC  Knee to opposite shoulder   Hooklying ball squeeze w/TA    Charges  1 TE, 1 NMRE, 1 MT, 1 TA

## 2025-05-14 ENCOUNTER — APPOINTMENT (OUTPATIENT)
Dept: PHYSICAL THERAPY | Age: 55
End: 2025-05-14
Attending: NURSE PRACTITIONER
Payer: OTHER MISCELLANEOUS

## 2025-05-15 ENCOUNTER — OFFICE VISIT (OUTPATIENT)
Dept: PHYSICAL THERAPY | Age: 55
End: 2025-05-15
Attending: NURSE PRACTITIONER
Payer: OTHER MISCELLANEOUS

## 2025-05-15 PROCEDURE — 97110 THERAPEUTIC EXERCISES: CPT

## 2025-05-15 PROCEDURE — 97112 NEUROMUSCULAR REEDUCATION: CPT

## 2025-05-15 NOTE — PROGRESS NOTES
Patient: Estevan Alba (55 year old, female) Referring Provider:  Insurance:   Diagnosis: Compression fracture of L1 vertebra, initial encounter ([facility]) (S32.010A) Jose Elias Gray  WORK[employer] COMP   Date of Surgery: n/a Next MD visit:  N/A   Precautions:  None no appt scheduled Referral Information:    Date of Evaluation: Req: 8, Auth: 8, Exp: 7/10/2025    05/01/25 POC Auth Visits:  8       Today's Date   5/15/2025    Subjective  Pt states that back pain today is 7.5/10.  She did her exercises 2 times since last visit.       Pain: 7/10     Objective  see flow sheet below for treatment            Assessment  Pt reported pulling along R lumbar side with TB exercises.  Added stretches as HEP.    Goals (to be met in 8 visits)   Pt will be I with HEP to improve therapy outcome and self manage symptoms.  Pt will be able to sleep through night without wakening due to LBP.   Pt will be able to bend to don/doff shoes without LBP.   Pt will be able to lift and carry 10# or more without LBP.              Plan  assess response to treatment    Treatment Last 4 Visits  Treatment Day: 5       5/6/2025 5/8/2025 5/13/2025 5/15/2025   Spine Treatment   Therapeutic Exercise LTR 10x   SKTC 10x R/L  Knee to opposite shoulder stretch 5 secx10    Nustep L3 x 5 min  LTR 10x   SKTC w/towel 10x R/L  Knee to opposite shoulder 5 secx 10 R/L Nustep L5 x 5 min  Supine knee to opposite shoulder stretch [id number] R/L  Supine sciatic nerve glides 10x R/L  Prone hip ext 10x R/L Nustep L5 x 5 min  Sciatic nerve glides 10x R/L  Shoulder ext alt w/TA 20x R/L  Seated SB rollout stretch 5x ea    Neuro Re-Education Hooklying TA bracing w/ball 5 secx20   Seated SB roll out stretch 10x  Hooklying TA bracing w/ball squeeze 5 secx20  Bridges w/TA 10x  Hooklying bracing w/ball squeeze 5 secx20   Bridges w/TA 10x2  Clamshells 20x R/L  SLR w/TA 10x2 R/L Hooklying bracing w/ball squeeze 5 secx 20  Bridges 20x   Clamshells 20x R/L  Pallof press green TB 20x R/L    Manual Therapy Prone lumbar paraspinals MFR  Prone lumbar paraspinals MFR R/L Prone lumbar paraspinals MFR    Therapeutic Activity  Educated pt on appropriate use of abdominal brace and not wearing it all day Pt education on bracing when going for walks, transfers, bending    Therapeutic Exercise Minutes 15 20 15 15   Neuro Re-Educ Minutes 15 8 15 25   Manual Therapy Minutes 10 8 8    Therapeutic Activity Minutes  8 8    Total Time Of Timed Procedures 40 44 46 40   Total Time Of Service-Based Procedures 0 0 0 0   Total Treatment Time 40 44 46 40   HEP LTR  SKTC  Knee to opposite shoulder   Hooklying ball squeeze w/TA   Reaching on table stretch         HEP  Reaching on table stretch     Charges  1 TE, 2 NMRE

## 2025-05-19 ENCOUNTER — APPOINTMENT (OUTPATIENT)
Dept: PHYSICAL THERAPY | Age: 55
End: 2025-05-19
Attending: NURSE PRACTITIONER
Payer: OTHER MISCELLANEOUS

## 2025-05-20 ENCOUNTER — OFFICE VISIT (OUTPATIENT)
Dept: PHYSICAL THERAPY | Age: 55
End: 2025-05-20
Attending: NURSE PRACTITIONER
Payer: OTHER MISCELLANEOUS

## 2025-05-20 PROCEDURE — 97014 ELECTRIC STIMULATION THERAPY: CPT

## 2025-05-20 PROCEDURE — 97110 THERAPEUTIC EXERCISES: CPT

## 2025-05-20 PROCEDURE — 97112 NEUROMUSCULAR REEDUCATION: CPT

## 2025-05-20 NOTE — PROGRESS NOTES
Patient: Estevan Alba (55 year old, female) Referring Provider:  Insurance:   Diagnosis: Compression fracture of L1 vertebra, initial encounter ([facility]) (S32.010A) Jose Elias Gray  WORKERS COMP   Date of Surgery: n/a Next MD visit:  N/A   Precautions:  None no appt scheduled Referral Information:    Date of Evaluation: Req: 8, Auth: 8, Exp: 7/10/2025    05/01/25 POC Auth Visits:  8       Today's Date   5/20/2025    Subjective  Pt states that she picked up leaves in her yard yesterday and has more back pain now.  She iced and took pain meds.       Pain: 7/10     Objective  see flow sheet below for treatment            Assessment  Pt requested IFC for pain.  Trialed IFC and provided pt with rows with green TB for HEP.    Goals (to be met in 8 visits)   Pt will be I with HEP to improve therapy outcome and self manage symptoms.  Pt will be able to sleep through night without wakening due to LBP.   Pt will be able to bend to don/doff shoes without LBP.   Pt will be able to lift and carry 10# or more without LBP.                Plan  assess response to treatment    Treatment Last 4 Visits  Treatment Day: 6       5/8/2025 5/13/2025 5/15/2025 5/20/2025   Spine Treatment   Therapeutic Exercise Nustep L3 x 5 min  LTR 10x   SKTC w/towel 10x R/L  Knee to opposite shoulder 5 secx 10 R/L Nustep L5 x 5 min  Supine knee to opposite shoulder stretch [id number] R/L  Supine sciatic nerve glides 10x R/L  Prone hip ext 10x R/L Nustep L5 x 5 min  Sciatic nerve glides 10x R/L  Shoulder ext alt w/TA 20x R/L  Seated SB rollout stretch 5x ea  Nustep L5 x 7 min  Seated prayer stretch hands on table 3 way [id number] ea      Neuro Re-Education Hooklying TA bracing w/ball squeeze 5 secx20  Bridges w/TA 10x  Hooklying bracing w/ball squeeze 5 secx20   Bridges w/TA 10x2  Clamshells 20x R/L  SLR w/TA 10x2 R/L Hooklying bracing w/ball squeeze 5 secx 20  Bridges 20x   Clamshells 20x R/L  Pallof press green TB 20x R/L Rows green TB 20x   Alt row green TB  20x R/L   Manual Therapy Prone lumbar paraspinals MFR R/L Prone lumbar paraspinals MFR     Therapeutic Activity Educated pt on appropriate use of abdominal brace and not wearing it all day Pt education on bracing when going for walks, transfers, bending     Modalities    Prone IFC w/ice x 10 min    Therapeutic Exercise Minutes 20 15 15 15   Neuro Re-Educ Minutes 8 15 25 10   Manual Therapy Minutes 8 8     Therapeutic Activity Minutes 8 8     E-Stim (Unattended) Minutes    10   Total Time Of Timed Procedures 44 46 40 25   Total Time Of Service-Based Procedures 0 0 0 10   Total Treatment Time 44 46 40 35   HEP   Reaching on table stretch  Rows green TB  Alt row green TB         HEP  Rows green TB  Alt row green TB     Charges  1 TE, 1 NMRE, 1 E-stim

## 2025-05-22 ENCOUNTER — OFFICE VISIT (OUTPATIENT)
Dept: PHYSICAL THERAPY | Age: 55
End: 2025-05-22
Attending: NURSE PRACTITIONER
Payer: OTHER MISCELLANEOUS

## 2025-05-22 PROCEDURE — 97014 ELECTRIC STIMULATION THERAPY: CPT

## 2025-05-22 PROCEDURE — 97112 NEUROMUSCULAR REEDUCATION: CPT

## 2025-05-22 PROCEDURE — 97110 THERAPEUTIC EXERCISES: CPT

## 2025-05-22 NOTE — PROGRESS NOTES
Patient: Estevan Alba (55 year old, female) Referring Provider:  Insurance:   Diagnosis: Compression fracture of L1 vertebra, initial encounter ([facility]) (S32.010A) Jose Elias Gray  WORK[employer] COMP   Date of Surgery: n/a Next MD visit:  N/A   Precautions:  None no appt scheduled Referral Information:    Date of Evaluation: Req: 8, Auth: 8, Exp: 7/10/2025    05/01/25 POC Auth Visits:  8       Today's Date   5/22/2025    Subjective  P reports relief with estim from last session. She did not have to ice in the evening and did not have much pain.       Pain: 6/10     Objective  see flow sheet below for treatment       Trunk       5/1/2025 5/22/2025   Trunk ROM/MMT   Trunk Flex 50%       +pain 75%       +pain   Trunk Extension 25%       +pain 50%       +min pain   Trunk Rt Side Bend WFL WFL   Trunk Lt Side Bend WFL WFL   Trunk Rt Rotation 50%       +pain 75%       min pain   Trunk Lt Rotation 75% 75%       min pain   , Hip       5/1/2025 5/22/2025   Hip ROM/MMT   Rt Hip Flexion MMT (L2) 4 4+   Lt Hip Flexion MMT (L2) 4+ 4+   Rt Hip Extension MMT NT 4-       +min pain   Lt Hip Extension MMT NT 4-       +min pain   Rt Hip Abduction MMT 3-       +pain 3+       pain   Lt Hip Abduction MMT 3-       +pain 3+       pain   , Knee       5/1/2025 5/22/2025   Knee ROM/MMT   Rt Knee Flexion MMT 4+ 4+   Lt Knee Flexion MMT 5 5   Rt Knee Extension MMT (L3) 4+ 4+   Lt Knee Extension MMT (L3) 5 5        Assessment  Pt has attended 7 PT sessions for lumbar compression fracture. Pt presents with improved lumbar ROM, improving hip and knee strength. Pt has been educated and provided with HEP. Discharge pt at this time. Pt to follow up with MD.    Goals (to be met in 8 visits)   Pt will be I with HEP to improve therapy outcome and self manage symptoms.- MET  Pt will be able to sleep through night without wakening due to LBP. - MET  Pt will be able to bend to don/doff shoes without LBP. - NOT MET  Pt will be able to lift and carry 10# or more  without LBP. - NOT MET            Plan  Pt to follow up with MD.    Treatment Last 4 Visits  Treatment Day: 7       5/13/2025 5/15/2025 5/20/2025 5/22/2025   Spine Treatment   Therapeutic Exercise Nustep L5 x 5 min  Supine knee to opposite shoulder stretch [id number] R/L  Supine sciatic nerve glides 10x R/L  Prone hip ext 10x R/L Nustep L5 x 5 min  Sciatic nerve glides 10x R/L  Shoulder ext alt w/TA 20x R/L  Seated SB rollout stretch 5x ea  Nustep L5 x 7 min  Seated prayer stretch hands on table 3 way [id number]    Nustep L5 x7 min  Hip abd yellow TB 10x R/L  Hip ext yellow TB 10x R/L  Re-assessment    Neuro Re-Education Hooklying bracing w/ball squeeze 5 secx20   Bridges w/TA 10x2  Clamshells 20x R/L  SLR w/TA 10x2 R/L Hooklying bracing w/ball squeeze 5 secx 20  Bridges 20x   Clamshells 20x R/L  Pallof press green TB 20x R/L Rows green TB 20x   Alt row green TB 20x R/L Rows green TB 20x   Alt row green TB 20x R/L   Manual Therapy Prone lumbar paraspinals MFR      Therapeutic Activity Pt education on bracing when going for walks, transfers, bending      Modalities   Prone IFC w/ice x 10 min  IFC low back w/ice prone    Therapeutic Exercise Minutes 15 15 15 23   Neuro Re-Educ Minutes 15 25 10 10   Manual Therapy Minutes 8      Therapeutic Activity Minutes 8      E-Stim (Unattended) Minutes   10 10   Total Time Of Timed Procedures 46 40 25 33   Total Time Of Service-Based Procedures 0 0 10 10   Total Treatment Time 46 40 35 43   HEP  Reaching on table stretch  Rows green TB  Alt row green TB  Table pushups  Hip abd/ext yellow TB         HEP  Table pushups  Hip abd/ext yellow TB     Charges  2 TE, 1 NMRE, E-stim

## 2025-06-18 DIAGNOSIS — M54.50 ACUTE LOW BACK PAIN WITHOUT SCIATICA, UNSPECIFIED BACK PAIN LATERALITY: Primary | ICD-10-CM

## (undated) DEVICE — KIT ENDO ORCAPOD 160/180/190

## (undated) DEVICE — TUBING SCT CLR 6FT .25IN MDVC

## (undated) DEVICE — SYRINGE REGULAR TIP 60ML

## (undated) DEVICE — CANNULA NASAL ADULT PIGTAIL L7

## (undated) DEVICE — KIT CLEAN ENDOKIT 1.1OZ GOWNX2

## (undated) NOTE — LETTER
[facility]  [address], [place], [place]  Authorization for Surgical Operation and Procedure                                                                                           I hereby authorize Josias Cassidy MD, my physician and his/her assistants (if applicable), which may include medical students, residents, and/or fellows, to perform the following surgical operation/ procedure and administer such anesthesia as may be determined necessary by my physician: Operation/Procedure name (s) COLONOSCOPY on Norbella Alba   2.   I recognize that during the surgical operation/procedure, unforeseen conditions may necessitate additional or different procedures than those listed above.  I, therefore, further authorize and request that the above-named surgeon, assistants, or designees perform such procedures as are, in their judgment, necessary and desirable.    3.   My surgeon/physician has discussed prior to my surgery the potential benefits, risks and side effects of this procedure; the likelihood of achieving goals; and potential problems that might occur during recuperation.  They also discussed reasonable alternatives to the procedure, including risks, benefits, and side effects related to the alternatives and risks related to not receiving this procedure.  I have had all my questions answered and I acknowledge that no guarantee has been made as to the result that may be obtained.    4.   Should the need arise during my operation/procedure, which includes change of level of care prior to discharge, I also consent to the administration of blood and/or blood products.  Further, I understand that despite careful testing and screening of blood or blood products by collecting agencies, I may still be subject to ill effects as a result of receiving a blood transfusion and/or blood products.  The following are some, but not all, of the potential risks that can occur: fever and allergic  reactions, hemolytic reactions, transmission of diseases such as Hepatitis, AIDS and Cytomegalovirus (CMV) and fluid overload.  In the event that I wish to have an autologous transfusion of my own blood, or a directed donor transfusion, I will discuss this with my physician.  Check only if Refusing Blood or Blood Products  I understand refusal of blood or blood products as deemed necessary by my physician may have serious consequences to my condition to include possible death. I hereby assume responsibility for my refusal and release the hospital, its personnel, and my physicians from any responsibility for the consequences of my refusal.    o  Refuse   5.   I authorize the use of any specimen, organs, tissues, body parts or foreign objects that may be removed from my body during the operation/procedure for diagnosis, research or teaching purposes and their subsequent disposal by hospital authorities.  I also authorize the release of specimen test results and/or written reports to my treating physician on the hospital medical staff or other referring or consulting physicians involved in my care, at the discretion of the Pathologist or my treating physician.    6.   I consent to the photographing or videotaping of the operations or procedures to be performed, including appropriate portions of my body for medical, scientific, or educational purposes, provided my identity is not revealed by the pictures or by descriptive texts accompanying them.  If the procedure has been photographed/videotaped, the surgeon will obtain the original picture, image, videotape or CD.  The hospital will not be responsible for storage, release or maintenance of the picture, image, tape or CD.    7.   I consent to the presence of a  or observers in the operating room as deemed necessary by my physician or their designees.    8.   I recognize that in the event my procedure results in extended X-Ray/fluoroscopy time, I may  develop a skin reaction.    9. If I have a Do Not Attempt Resuscitation (DNAR) order in place, that status will be suspended while in the operating room, procedural suite, and during the recovery period unless otherwise explicitly stated by me (or a person authorized to consent on my behalf). The surgeon or my attending physician will determine when the applicable recovery period ends for purposes of reinstating the DNAR order.  10. Patients having a sterilization procedure: I understand that if the procedure is successful the results will be permanent and it will therefore be impossible for me to inseminate, conceive, or bear children.  I also understand that the procedure is intended to result in sterility, although the result has not been guaranteed.   11. I acknowledge that my physician has explained sedation/analgesia administration to me including the risk and benefits I consent to the administration of sedation/analgesia as may be necessary or desirable in the judgment of my physician.    I CERTIFY THAT I HAVE READ AND FULLY UNDERSTAND THE ABOVE CONSENT TO OPERATION and/or OTHER PROCEDURE.     _________________________________________ _________________________________     ___________________________________  Signature of Patient     Signature of Responsible Person                   Printed Name of Responsible Person                              _________________________________________ ______________________________        ___________________________________  Signature of Witness         Date  Time         Relationship to Patient    STATEMENT OF PHYSICIAN My signature below affirms that prior to the time of the procedure; I have explained to the patient and/or his/her legal representative, the risks and benefits involved in the proposed treatment and any reasonable alternative to the proposed treatment. I have also explained the risks and benefits involved in refusal of the proposed treatment and alternatives  to the proposed treatment and have answered the patient's questions. If I have a significant financial interest in a co-management agreement or a significant financial interest in any product or implant, or other significant relationship used in this procedure/surgery, I have disclosed this and had a discussion with my patient.     _______________________________________________________________ _____________________________  (Signature of Physician)                                                                                         (Date)                                   (Time)  Patient Name: Estevan Alba    : 3/18/1970   Printed: 2024      Medical Record #: [id number]                                              Page 1 of 1

## (undated) NOTE — LETTER
12/07/21    Mary Brand  [address]  [address] [place] [address]      Dear Salima Ridley records indicate that you have outstanding lab work and or testing that was ordered for you and has not yet been completed:  Orders Placed This Encounter

## (undated) NOTE — ED AVS SNAPSHOT
Frank Tony   MRN: [id number]    Department:  [facility] Emergency Department   Date of Visit:  10/22/2017           Disclosure     Insurance plans vary and the physician(s) referred by the ER may not be covered by your plan.  Please contact CARE PHYSICIAN AT ONCE OR RETURN IMMEDIATELY TO THE EMERGENCY DEPARTMENT. If you have been prescribed any medication(s), please fill your prescription right away and begin taking the medication(s) as directed.   If you believe that any of the medications

## (undated) NOTE — LETTER
[facility]  Administration of Anesthesia  Estevan VARGHESE agree to be cared for by a physician anesthesiologist alone and/or with a nurse anesthetist, who is specially trained to monitor me and give me medicine to put me to sleep or keep me comfortable during my procedure    I understand that my anesthesiologist and/or anesthetist is not an employee or agent of [facility] or [employer]. He or she works for [facility]    As the patient asking for anesthesia services, I agree to:  Allow the anesthesiologist (anesthesia doctor) to give me medicine and do additional procedures as necessary. Some examples are: Starting or using an “IV” to give me medicine, fluids or blood during my procedure, and having a breathing tube placed to help me breathe when I’m asleep (intubation). In the event that my heart stops working properly, I understand that my anesthesiologist will make every effort to sustain my life, unless otherwise directed by [facility] Do Not Resuscitate documents.  Tell my anesthesia doctor before my procedure:  If I am pregnant.  The last time that I ate or drank.  iii. All of the medicines I take (including prescriptions, herbal supplements, and pills I can buy without a prescription (including street drugs/illegal medications). Failure to inform my anesthesiologist about these medicines may increase my risk of anesthetic complications.  iv.If I am allergic to anything or have had a reaction to anesthesia before.  I understand how the anesthesia medicine will help me (benefits).  I understand that with any type of anesthesia medicine there are risks:  The most common risks are: nausea, vomiting, sore throat, muscle soreness, damage to my eyes, mouth, or teeth (from breathing tube placement).  Rare risks include: remembering what happened during my procedure, allergic reactions to medications, injury to my airway, heart, lungs, vision, nerves, or  muscles and in extremely rare instances death.  My doctor has explained to me other choices available to me for my care (alternatives).  Pregnant Patients (“epidural”):  I understand that the risks of having an epidural (medicine given into my back to help control pain during labor), include itching, low blood pressure, difficulty urinating, headache or slowing of the baby’s heart. Very rare risks include infection, bleeding, seizure, irregular heart rhythms and nerve injury.  Regional Anesthesia (“spinal”, “epidural”, & “nerve blocks”):  I understand that rare but potential complications include headache, bleeding, infection, seizure, irregular heart rhythms, and nerve injury.    _____________________________________________________________________________  Patient (or Representative) Signature/Relationship to Patient  Date   Time    _____________________________________________________________________________   Name (if used)    Language/Organization   Time    _____________________________________________________________________________  Nurse Anesthetist Signature     Date   Time  _____________________________________________________________________________  Anesthesiologist Signature     Date   Time  I have discussed the procedure and information above with the patient (or patient’s representative) and answered their questions. The patient or their representative has agreed to have anesthesia services.    _____________________________________________________________________________  Witness        Date   Time  I have verified that the signature is that of the patient or patient’s representative, and that it was signed before the procedure  Patient Name: Estevan Alba     : 3/18/1970                 Printed: 2024 at 7:12 AM    Medical Record #: [id number]                                            Page 1 of 1  ----------ANESTHESIA CONSENT----------

## (undated) NOTE — LETTER
Date & Time: 3/25/2025, 2:19 PM  Patient: Estevan Alba  Encounter Provider(s):    Will Pappas MD       To Whom It May Concern:    Estevan Alba was seen and treated in our department on 3/25/2025. She should not return to work until Monday 03/31/2025 .    If you have any questions or concerns, please do not hesitate to call.        _____________________________  Physician/APC Signature

## (undated) NOTE — LETTER
04/16/21        Alverda Opitz  [address]  [address] [place] [address]      Dear Stan [facility] records indicate that you have outstanding lab work and or testing that was ordered for you and has not yet been completed:  Orders Placed This Encounter